# Patient Record
Sex: MALE | Race: WHITE | NOT HISPANIC OR LATINO | Employment: FULL TIME | URBAN - METROPOLITAN AREA
[De-identification: names, ages, dates, MRNs, and addresses within clinical notes are randomized per-mention and may not be internally consistent; named-entity substitution may affect disease eponyms.]

---

## 2019-09-03 ENCOUNTER — OFFICE VISIT - HEALTHEAST (OUTPATIENT)
Dept: FAMILY MEDICINE | Facility: CLINIC | Age: 44
End: 2019-09-03

## 2019-09-03 DIAGNOSIS — E66.09 CLASS 1 OBESITY DUE TO EXCESS CALORIES WITHOUT SERIOUS COMORBIDITY WITH BODY MASS INDEX (BMI) OF 33.0 TO 33.9 IN ADULT: ICD-10-CM

## 2019-09-03 DIAGNOSIS — R03.0 ELEVATED BLOOD PRESSURE READING WITHOUT DIAGNOSIS OF HYPERTENSION: ICD-10-CM

## 2019-09-03 DIAGNOSIS — R06.83 SNORING: ICD-10-CM

## 2019-09-03 DIAGNOSIS — F10.10 ALCOHOL ABUSE: ICD-10-CM

## 2019-09-03 DIAGNOSIS — Z00.00 ROUTINE GENERAL MEDICAL EXAMINATION AT A HEALTH CARE FACILITY: ICD-10-CM

## 2019-09-03 DIAGNOSIS — Z80.42 FAMILY HISTORY OF PROSTATE CANCER: ICD-10-CM

## 2019-09-03 DIAGNOSIS — E66.811 CLASS 1 OBESITY DUE TO EXCESS CALORIES WITHOUT SERIOUS COMORBIDITY WITH BODY MASS INDEX (BMI) OF 33.0 TO 33.9 IN ADULT: ICD-10-CM

## 2019-09-03 DIAGNOSIS — L71.9 ACNE ROSACEA: ICD-10-CM

## 2019-09-03 LAB
ALBUMIN SERPL-MCNC: 4.4 G/DL (ref 3.5–5)
ALP SERPL-CCNC: 67 U/L (ref 45–120)
ALT SERPL W P-5'-P-CCNC: 51 U/L (ref 0–45)
ANION GAP SERPL CALCULATED.3IONS-SCNC: 12 MMOL/L (ref 5–18)
AST SERPL W P-5'-P-CCNC: 34 U/L (ref 0–40)
BILIRUB SERPL-MCNC: 0.5 MG/DL (ref 0–1)
BUN SERPL-MCNC: 11 MG/DL (ref 8–22)
CALCIUM SERPL-MCNC: 10.3 MG/DL (ref 8.5–10.5)
CHLORIDE BLD-SCNC: 105 MMOL/L (ref 98–107)
CHOLEST SERPL-MCNC: 164 MG/DL
CO2 SERPL-SCNC: 25 MMOL/L (ref 22–31)
CREAT SERPL-MCNC: 0.99 MG/DL (ref 0.7–1.3)
ERYTHROCYTE [DISTWIDTH] IN BLOOD BY AUTOMATED COUNT: 11.1 % (ref 11–14.5)
FASTING STATUS PATIENT QL REPORTED: NO
GFR SERPL CREATININE-BSD FRML MDRD: >60 ML/MIN/1.73M2
GGT SERPL-CCNC: 37 U/L (ref 0–50)
GLUCOSE BLD-MCNC: 95 MG/DL (ref 70–125)
HBA1C MFR BLD: 5.6 % (ref 3.5–6)
HCT VFR BLD AUTO: 48.1 % (ref 40–54)
HDLC SERPL-MCNC: 38 MG/DL
HGB BLD-MCNC: 16.7 G/DL (ref 14–18)
MCH RBC QN AUTO: 31.8 PG (ref 27–34)
MCHC RBC AUTO-ENTMCNC: 34.7 G/DL (ref 32–36)
MCV RBC AUTO: 92 FL (ref 80–100)
PLATELET # BLD AUTO: 268 THOU/UL (ref 140–440)
PMV BLD AUTO: 7.2 FL (ref 7–10)
POTASSIUM BLD-SCNC: 4.5 MMOL/L (ref 3.5–5)
PROT SERPL-MCNC: 7.7 G/DL (ref 6–8)
PSA SERPL-MCNC: 1.4 NG/ML (ref 0–2.5)
RBC # BLD AUTO: 5.25 MILL/UL (ref 4.4–6.2)
SODIUM SERPL-SCNC: 142 MMOL/L (ref 136–145)
WBC: 7.5 THOU/UL (ref 4–11)

## 2019-09-03 ASSESSMENT — MIFFLIN-ST. JEOR: SCORE: 1983.75

## 2019-10-22 ENCOUNTER — OFFICE VISIT - HEALTHEAST (OUTPATIENT)
Dept: SLEEP MEDICINE | Facility: CLINIC | Age: 44
End: 2019-10-22

## 2019-10-22 DIAGNOSIS — R03.0 ELEVATED BLOOD PRESSURE READING: ICD-10-CM

## 2019-10-22 DIAGNOSIS — R29.818 SUSPECTED SLEEP APNEA: ICD-10-CM

## 2019-10-22 DIAGNOSIS — G47.8 SLEEP DYSFUNCTION WITH SLEEP STAGE DISTURBANCE: ICD-10-CM

## 2019-10-22 DIAGNOSIS — R06.83 SNORING: ICD-10-CM

## 2019-10-22 ASSESSMENT — MIFFLIN-ST. JEOR: SCORE: 1997.36

## 2019-11-07 ENCOUNTER — RECORDS - HEALTHEAST (OUTPATIENT)
Dept: SLEEP MEDICINE | Facility: CLINIC | Age: 44
End: 2019-11-07

## 2019-11-07 ENCOUNTER — RECORDS - HEALTHEAST (OUTPATIENT)
Dept: ADMINISTRATIVE | Facility: OTHER | Age: 44
End: 2019-11-07

## 2019-11-07 DIAGNOSIS — G47.8 OTHER SLEEP DISORDERS: ICD-10-CM

## 2019-11-07 DIAGNOSIS — R06.83 SNORING: ICD-10-CM

## 2019-11-07 DIAGNOSIS — R29.818 OTHER SYMPTOMS AND SIGNS INVOLVING THE NERVOUS SYSTEM: ICD-10-CM

## 2019-11-07 DIAGNOSIS — R03.0 ELEVATED BLOOD-PRESSURE READING, WITHOUT DIAGNOSIS OF HYPERTENSION: ICD-10-CM

## 2019-11-13 ENCOUNTER — COMMUNICATION - HEALTHEAST (OUTPATIENT)
Dept: ADMINISTRATIVE | Facility: CLINIC | Age: 44
End: 2019-11-13

## 2019-11-13 ENCOUNTER — COMMUNICATION - HEALTHEAST (OUTPATIENT)
Dept: SLEEP MEDICINE | Facility: CLINIC | Age: 44
End: 2019-11-13

## 2019-11-13 DIAGNOSIS — G47.33 OBSTRUCTIVE SLEEP APNEA: ICD-10-CM

## 2019-11-14 ENCOUNTER — AMBULATORY - HEALTHEAST (OUTPATIENT)
Dept: SLEEP MEDICINE | Facility: CLINIC | Age: 44
End: 2019-11-14

## 2019-12-27 ENCOUNTER — OFFICE VISIT - HEALTHEAST (OUTPATIENT)
Dept: FAMILY MEDICINE | Facility: CLINIC | Age: 44
End: 2019-12-27

## 2019-12-27 DIAGNOSIS — R03.0 ELEVATED BLOOD PRESSURE READING WITHOUT DIAGNOSIS OF HYPERTENSION: ICD-10-CM

## 2019-12-27 DIAGNOSIS — F10.10 ALCOHOL ABUSE: ICD-10-CM

## 2019-12-27 DIAGNOSIS — Z23 NEED FOR VACCINATION: ICD-10-CM

## 2019-12-27 DIAGNOSIS — G47.33 OSA ON CPAP: ICD-10-CM

## 2019-12-27 DIAGNOSIS — Z71.84 ENCOUNTER FOR COUNSELING FOR TRAVEL: ICD-10-CM

## 2020-02-03 ENCOUNTER — OFFICE VISIT - HEALTHEAST (OUTPATIENT)
Dept: SLEEP MEDICINE | Facility: CLINIC | Age: 45
End: 2020-02-03

## 2020-02-03 DIAGNOSIS — G47.33 OBSTRUCTIVE SLEEP APNEA: ICD-10-CM

## 2020-02-03 DIAGNOSIS — R03.0 ELEVATED BLOOD PRESSURE READING: ICD-10-CM

## 2020-02-03 DIAGNOSIS — G47.8 SLEEP DYSFUNCTION WITH SLEEP STAGE DISTURBANCE: ICD-10-CM

## 2020-02-03 ASSESSMENT — MIFFLIN-ST. JEOR: SCORE: 2006.43

## 2020-02-25 ENCOUNTER — OFFICE VISIT - HEALTHEAST (OUTPATIENT)
Dept: FAMILY MEDICINE | Facility: CLINIC | Age: 45
End: 2020-02-25

## 2020-02-25 DIAGNOSIS — I10 BENIGN ESSENTIAL HYPERTENSION: ICD-10-CM

## 2020-02-27 LAB
ATRIAL RATE - MUSE: 70 BPM
DIASTOLIC BLOOD PRESSURE - MUSE: NORMAL
INTERPRETATION ECG - MUSE: NORMAL
P AXIS - MUSE: 42 DEGREES
PR INTERVAL - MUSE: 184 MS
QRS DURATION - MUSE: 96 MS
QT - MUSE: 380 MS
QTC - MUSE: 410 MS
R AXIS - MUSE: 13 DEGREES
SYSTOLIC BLOOD PRESSURE - MUSE: NORMAL
T AXIS - MUSE: 15 DEGREES
VENTRICULAR RATE- MUSE: 70 BPM

## 2020-03-06 ENCOUNTER — COMMUNICATION - HEALTHEAST (OUTPATIENT)
Dept: SLEEP MEDICINE | Facility: CLINIC | Age: 45
End: 2020-03-06

## 2020-03-10 ENCOUNTER — OFFICE VISIT - HEALTHEAST (OUTPATIENT)
Dept: FAMILY MEDICINE | Facility: CLINIC | Age: 45
End: 2020-03-10

## 2020-03-10 DIAGNOSIS — G47.33 OSA ON CPAP: ICD-10-CM

## 2020-03-10 DIAGNOSIS — I10 BENIGN ESSENTIAL HYPERTENSION: ICD-10-CM

## 2020-03-10 DIAGNOSIS — I10 ESSENTIAL HYPERTENSION, BENIGN: ICD-10-CM

## 2020-03-10 DIAGNOSIS — F10.10 ALCOHOL ABUSE: ICD-10-CM

## 2020-06-30 ENCOUNTER — OFFICE VISIT - HEALTHEAST (OUTPATIENT)
Dept: FAMILY MEDICINE | Facility: CLINIC | Age: 45
End: 2020-06-30

## 2020-06-30 DIAGNOSIS — S92.425A CLOSED NONDISPLACED FRACTURE OF DISTAL PHALANX OF LEFT GREAT TOE, INITIAL ENCOUNTER: ICD-10-CM

## 2020-06-30 DIAGNOSIS — S99.922A TOE INJURY, LEFT, INITIAL ENCOUNTER: ICD-10-CM

## 2020-09-15 ENCOUNTER — COMMUNICATION - HEALTHEAST (OUTPATIENT)
Dept: FAMILY MEDICINE | Facility: CLINIC | Age: 45
End: 2020-09-15

## 2020-09-15 DIAGNOSIS — I10 BENIGN ESSENTIAL HYPERTENSION: ICD-10-CM

## 2020-09-24 ENCOUNTER — OFFICE VISIT - HEALTHEAST (OUTPATIENT)
Dept: FAMILY MEDICINE | Facility: CLINIC | Age: 45
End: 2020-09-24

## 2020-09-24 DIAGNOSIS — R74.01 ELEVATED ALT MEASUREMENT: ICD-10-CM

## 2020-09-24 DIAGNOSIS — E78.5 DYSLIPIDEMIA: ICD-10-CM

## 2020-09-24 DIAGNOSIS — G47.33 OSA ON CPAP: ICD-10-CM

## 2020-09-24 DIAGNOSIS — E66.811 CLASS 1 OBESITY DUE TO EXCESS CALORIES WITH SERIOUS COMORBIDITY AND BODY MASS INDEX (BMI) OF 34.0 TO 34.9 IN ADULT: ICD-10-CM

## 2020-09-24 DIAGNOSIS — I10 ESSENTIAL HYPERTENSION, BENIGN: ICD-10-CM

## 2020-09-24 DIAGNOSIS — E66.09 CLASS 1 OBESITY DUE TO EXCESS CALORIES WITH SERIOUS COMORBIDITY AND BODY MASS INDEX (BMI) OF 34.0 TO 34.9 IN ADULT: ICD-10-CM

## 2020-09-24 DIAGNOSIS — Z11.4 ENCOUNTER FOR SCREENING FOR HIV: ICD-10-CM

## 2020-09-24 DIAGNOSIS — Z00.00 ROUTINE GENERAL MEDICAL EXAMINATION AT A HEALTH CARE FACILITY: ICD-10-CM

## 2020-09-24 LAB
ALBUMIN SERPL-MCNC: 4.4 G/DL (ref 3.5–5)
ALP SERPL-CCNC: 54 U/L (ref 45–120)
ALT SERPL W P-5'-P-CCNC: 75 U/L (ref 0–45)
ANION GAP SERPL CALCULATED.3IONS-SCNC: 8 MMOL/L (ref 5–18)
AST SERPL W P-5'-P-CCNC: 40 U/L (ref 0–40)
BILIRUB SERPL-MCNC: 0.5 MG/DL (ref 0–1)
BUN SERPL-MCNC: 15 MG/DL (ref 8–22)
CALCIUM SERPL-MCNC: 10.4 MG/DL (ref 8.5–10.5)
CHLORIDE BLD-SCNC: 106 MMOL/L (ref 98–107)
CHOLEST SERPL-MCNC: 153 MG/DL
CO2 SERPL-SCNC: 28 MMOL/L (ref 22–31)
CREAT SERPL-MCNC: 0.88 MG/DL (ref 0.7–1.3)
FASTING STATUS PATIENT QL REPORTED: YES
GFR SERPL CREATININE-BSD FRML MDRD: >60 ML/MIN/1.73M2
GLUCOSE BLD-MCNC: 89 MG/DL (ref 70–125)
HDLC SERPL-MCNC: 41 MG/DL
HIV 1+2 AB+HIV1 P24 AG SERPL QL IA: NEGATIVE
LDLC SERPL CALC-MCNC: 81 MG/DL
POTASSIUM BLD-SCNC: 4.4 MMOL/L (ref 3.5–5)
PROT SERPL-MCNC: 7.6 G/DL (ref 6–8)
SODIUM SERPL-SCNC: 142 MMOL/L (ref 136–145)
TRIGL SERPL-MCNC: 153 MG/DL

## 2020-09-24 ASSESSMENT — MIFFLIN-ST. JEOR: SCORE: 2006.43

## 2020-12-18 DIAGNOSIS — G47.33 OSA (OBSTRUCTIVE SLEEP APNEA): Primary | ICD-10-CM

## 2020-12-21 ENCOUNTER — TELEPHONE (OUTPATIENT)
Dept: SLEEP MEDICINE | Facility: CLINIC | Age: 45
End: 2020-12-21

## 2020-12-21 DIAGNOSIS — G47.33 OBSTRUCTIVE SLEEP APNEA: Primary | ICD-10-CM

## 2020-12-21 NOTE — TELEPHONE ENCOUNTER
Reason for call:  Other   Patient called regarding (reason for call): prescription    Additional comments:   CPAP Supplies- mask & headband    Phone number to reach patient:  Cell number on file:    Telephone Information:   Mobile [phone]       Best Time:  any    Can we leave a detailed message on this number?  YES    Travel screening: Not Applicable

## 2021-01-06 RX ORDER — LISINOPRIL 20 MG/1
TABLET ORAL
COMMUNITY
Start: 2020-09-16 | End: 2021-09-28 | Stop reason: DRUGHIGH

## 2021-01-06 NOTE — PATIENT INSTRUCTIONS

## 2021-01-06 NOTE — PROGRESS NOTES
Flash Nicholas is a 45 year old male who is being evaluated via a billable video visit.      How would you like to obtain your AVS? Mail a copy  If the video visit is dropped, the invitation should be resent by: Send to e-mail at: [email or web address]  Will anyone else be joining your video visit? No    Video-Visit Details    Type of service:  Video Visit    Originating Location (pt. Location): Home    Distant Location (provider location):  [facility]     Platform used for Video Visit: AntionetteLumiata     Thank you for the opportunity to participate in the care of Flash Nicholas.     He is a 45 year old y/o male patient who comes to the sleep medicine clinic for follow up.  Since the patient's last clinical visit with me, he is doing well on CPAP. He just needs a prescription to get supplies     Assessment and Plan:  In summary Flash Nicholas is a 45 year old year old male who is here for follow up.    1. Obstructive sleep apnea  I congratulated the patient on his excellent CPAP usage. I will keep him on the same pressure range as requested.  - COMPREHENSIVE DME     Compliance Download data for 30 Days:  Pressure setting:APAP 5-15 cwp  95% pressure:9.5 cwp  Residual AHI:0.8 events per hour  Leak:Minimal  Compliance:97%  Mask Tolerance:Good  Skin irritation:None  DME:[facility]    Sleep-Wake Cycle:    The patient likes to initiate sleep at around 8PM with a sleep latency of less than 10 minutes. The patient has 2 nocturnal awakenings. Final wake up time is around 7AM.      Past Medical History:   Diagnosis Date     HTN (hypertension)      KEN (obstructive sleep apnea)        History reviewed. No pertinent surgical history.    Social History     Socioeconomic History     Marital status:      Spouse name: Not on file     Number of children: Not on file     Years of education: Not on file     Highest education level: Not on file   Occupational History     Not on file   Social Needs      Financial resource strain: Not on file     Food insecurity     Worry: Not on file     Inability: Not on file     Transportation needs     Medical: Not on file     Non-medical: Not on file   Tobacco Use     Smoking status: Never Smoker     Smokeless tobacco: Never Used   Substance and Sexual Activity     Alcohol use: Not on file     Drug use: Not on file     Sexual activity: Not on file   Lifestyle     Physical activity     Days per week: Not on file     Minutes per session: Not on file     Stress: Not on file   Relationships     Social connections     Talks on phone: Not on file     Gets together: Not on file     Attends Pentecostalism service: Not on file     Active member of club or organization: Not on file     Attends meetings of clubs or organizations: Not on file     Relationship status: Not on file     Intimate partner violence     Fear of current or ex partner: Not on file     Emotionally abused: Not on file     Physically abused: Not on file     Forced sexual activity: Not on file   Other Topics Concern     Not on file   Social History Narrative     Not on file       Current Outpatient Medications   Medication Sig Dispense Refill     lisinopril (ZESTRIL) 20 MG tablet TAKE 1 TABLET BY MOUTH EVERY DAY         No Known Allergies    Physical Exam:  GEN: NAD,  Psych: normal mood, normal affect    Labs/Studies:    I reviewed the efficacy and compliance report from his device. Data summarized on the HPI and the PAP compliance flow sheet.        Patient verbalized understanding of these issues, agrees with the plan and all questions were answered today. Patient was given an opportuntity to voice any other symptoms or concerns not listed above. Patient did not have any other symptoms or concerns.      Jordin Ng DO  Board Certified in Internal Medicine and Sleep Medicine    (Note created with Dragon voice recognition and unintended spelling errors and word substitutions may occur)     I spent a total of 15  minutes of face-to-face encounter and in preparation for this clinic visit.    Audio and visual devices were used for this virtual clinic visit with permission from patient.

## 2021-01-07 ENCOUNTER — VIRTUAL VISIT (OUTPATIENT)
Dept: SLEEP MEDICINE | Facility: CLINIC | Age: 46
End: 2021-01-07
Payer: COMMERCIAL

## 2021-01-07 DIAGNOSIS — G47.33 OBSTRUCTIVE SLEEP APNEA: Primary | ICD-10-CM

## 2021-01-07 PROCEDURE — 99212 OFFICE O/P EST SF 10 MIN: CPT | Mod: 95 | Performed by: INTERNAL MEDICINE

## 2021-03-24 ENCOUNTER — COMMUNICATION - HEALTHEAST (OUTPATIENT)
Dept: FAMILY MEDICINE | Facility: CLINIC | Age: 46
End: 2021-03-24

## 2021-03-24 DIAGNOSIS — I10 BENIGN ESSENTIAL HYPERTENSION: ICD-10-CM

## 2021-04-06 ENCOUNTER — OFFICE VISIT - HEALTHEAST (OUTPATIENT)
Dept: FAMILY MEDICINE | Facility: CLINIC | Age: 46
End: 2021-04-06

## 2021-04-06 DIAGNOSIS — G47.33 OSA ON CPAP: ICD-10-CM

## 2021-04-06 DIAGNOSIS — I10 ESSENTIAL HYPERTENSION, BENIGN: ICD-10-CM

## 2021-04-06 DIAGNOSIS — E66.01 MORBID OBESITY (H): ICD-10-CM

## 2021-04-06 DIAGNOSIS — R74.01 ELEVATED ALT MEASUREMENT: ICD-10-CM

## 2021-04-06 LAB
ALBUMIN SERPL-MCNC: 4.3 G/DL (ref 3.5–5)
ALP SERPL-CCNC: 56 U/L (ref 45–120)
ALT SERPL W P-5'-P-CCNC: 79 U/L (ref 0–45)
ANION GAP SERPL CALCULATED.3IONS-SCNC: 11 MMOL/L (ref 5–18)
AST SERPL W P-5'-P-CCNC: 44 U/L (ref 0–40)
BILIRUB SERPL-MCNC: 0.6 MG/DL (ref 0–1)
BUN SERPL-MCNC: 13 MG/DL (ref 8–22)
CALCIUM SERPL-MCNC: 10 MG/DL (ref 8.5–10.5)
CHLORIDE BLD-SCNC: 103 MMOL/L (ref 98–107)
CO2 SERPL-SCNC: 26 MMOL/L (ref 22–31)
CREAT SERPL-MCNC: 0.91 MG/DL (ref 0.7–1.3)
GFR SERPL CREATININE-BSD FRML MDRD: >60 ML/MIN/1.73M2
GLUCOSE BLD-MCNC: 97 MG/DL (ref 70–125)
POTASSIUM BLD-SCNC: 4.6 MMOL/L (ref 3.5–5)
PROT SERPL-MCNC: 7.5 G/DL (ref 6–8)
SODIUM SERPL-SCNC: 140 MMOL/L (ref 136–145)

## 2021-04-07 LAB
HAV IGM SERPL QL IA: NEGATIVE
HBV CORE IGM SERPL QL IA: NEGATIVE
HBV SURFACE AG SERPL QL IA: NEGATIVE
HCV AB SERPL QL IA: NEGATIVE

## 2021-05-27 ENCOUNTER — RECORDS - HEALTHEAST (OUTPATIENT)
Dept: ADMINISTRATIVE | Facility: CLINIC | Age: 46
End: 2021-05-27

## 2021-05-31 NOTE — PROGRESS NOTES
Assessment/Plan:     1. Routine general medical examination at a health care facility  Routine healthcare maintenance reviewed.  Preventative cares discussed.  Tetanus booster was provided today.  Annual physical exams to continue.  - Tdap vaccine greater than or equal to 8yo IM    2. Class 1 obesity due to excess calories without serious comorbidity with body mass index (BMI) of 33.0 to 33.9 in adult  Dietary and exercise modifications reviewed for weight goal less than 220 pounds initially, less than 200 pounds ideally.  Nonfasting labs including total cholesterol and HDL cholesterol pending.  - HDL Cholesterol  - Cholesterol, Total  - A1c    3. Elevated blood pressure reading without diagnosis of hypertension  Elevated blood pressure 138/96 on recheck.  Dietary and exercise modification for weight goal as noted above.  Sleep study to rule out obstructive sleep apnea.  Role of alcohol in regard to blood pressure elevation also reviewed.  Anticipate blood pressure medication if persistent elevation noted.  - Comprehensive Metabolic Panel    4. Alcohol abuse  Alcohol abuse history having previously consumed 3 cases of beer per week.  Now has cut back to perhaps 8 beer on Sunday.  Does not feel that he has a problem per se however wife is adamant that this is excessive and has described yellowish eyes previously.  Improvement with cutting back on alcohol.  Will check CBC, GGT and conference of metabolic panels today.  Recommend abstaining from alcohol however needs to minimally restrict to no more than 2 ounces of alcohol daily.  - Comprehensive Metabolic Panel  - GGT (Gamma GT)  - HM2(CBC w/o Differential)    5. Family history of prostate cancer  PSA per patient request for family history of prostate cancer maternal grandfather.  Discussed indications for PSA screen.  Patient would like to complete testing at this time despite recommendations against.  - PSA (Prostatic-Specific Antigen), Annual Screen    6.  "Acne rosacea  Acne rosacea, mild.  Benefits of alcohol abstinence etc. reviewed.    7. Snoring  Sleep study to be completed to rule out obstructive sleep apnea.  Patient's wife states apneic episodes as long as 35 seconds previously noted.  Therapeutic lifestyle changes reviewed.  Benefits from abstinence from alcohol.  - Ambulatory referral to Sleep Medicine       I have had an Advance Directives discussion with the patient.  The following high BMI interventions were performed this visit: encouragement to exercise, weight monitoring, weight loss from baseline weight and lifestyle education regarding diet.  Ensure ongoing efforts to achieve weight goal < 220 pounds initially, < 200 pounds ideally.           Subjective:      Flash Nicholas is a 44 y.o. male who presents for an annual exam.  Has not been seen for physical and possibly 20 years.  Needs biometric paperwork completed for employer.  Nonfasting.  Does not want to wait for fasting labs.  Would like PSA screen done.  Wife also would like AST or ALT levels done with history of yellowish eyes when consuming significant alcohol historically up to 3 cases of beer per week now states cut back to about a beer on Sundays when at the [employer].  Wife has noticed apneic episodes dating up to 35 seconds in length.  Unaware of high blood pressure.  Often gets only 5 or 6 hours of sleep the most however can \"jump out of bed\".  No abdominal complaints.  No diarrhea.  Facial redness noted.  Worse during the summer with alcohol consumption especially.  Maternal grandfather with prostate cancer.  Past medical social and family history reviewed and updated as noted below.  Father  age 45 secondary to described heart attack.  Comprehensive review of systems as above otherwise all negative.     \"Dilia\" x   3 children: Fili (boy), Marcella (girl), Alban (boy)   No smoke x    EtOH:  prior history of 3 cases of beer per week, now perhaps 8 beer on " "  Surgeries: vasectomy 06   Hospitalizations: none   Work: [employer] (james at [employer])   Mom - \"spell\" at work resulting in \"passed out\"   Dad -  45 heart attack   1 younger sister -   Maternal grandfather with h/o prostate CA and esophageal CA    Healthy Habits:   Regular Exercise: No  Healthy Diet: No  Dental Visits Regularly: Yes  Seat Belt: Yes   Sexually active: Yes  Colonoscopy: N/A  Lipid Profile: Yes  Glucose Screen: Yes    Immunization History   Administered Date(s) Administered     Td, Adult, Absorbed 2005     Tdap 2019     Immunization status: needs Tdap booster.  Vision Screening:both eyes  Hearing: PASS     No current outpatient medications on file.     No current facility-administered medications for this visit.      History reviewed. No pertinent past medical history.  History reviewed. No pertinent surgical history.  Patient has no known allergies.  No family history on file.  Social History     Socioeconomic History     Marital status:      Spouse name: Not on file     Number of children: Not on file     Years of education: Not on file     Highest education level: Not on file   Occupational History     Not on file   Social Needs     Financial resource strain: Not on file     Food insecurity:     Worry: Not on file     Inability: Not on file     Transportation needs:     Medical: Not on file     Non-medical: Not on file   Tobacco Use     Smoking status: Former Smoker     Smokeless tobacco: Never Used   Substance and Sexual Activity     Alcohol use: Not on file     Drug use: Not on file     Sexual activity: Not on file   Lifestyle     Physical activity:     Days per week: Not on file     Minutes per session: Not on file     Stress: Not on file   Relationships     Social connections:     Talks on phone: Not on file     Gets together: Not on file     Attends Mosque service: Not on file     Active member of club or organization: Not on file     Attends " "meetings of clubs or organizations: Not on file     Relationship status: Not on file     Intimate partner violence:     Fear of current or ex partner: Not on file     Emotionally abused: Not on file     Physically abused: Not on file     Forced sexual activity: Not on file   Other Topics Concern     Not on file   Social History Narrative     Not on file       Review of Systems  Comprehensive ROS: as above, otherwise all negative.           Objective:     BP (!) 138/96   Pulse 73   Temp 98  F (36.7  C) (Oral)   Resp 20   Ht 5' 10.5\" (1.791 m)   Wt (!) 238 lb (108 kg)   SpO2 95%   BMI 33.67 kg/m    Body mass index is 33.67 kg/m .    Physical    General Appearance:    Alert, cooperative, no distress, appears stated age.  Obesity.   Head:    Normocephalic, without obvious abnormality, atraumatic   Eyes:    PERRL, conjunctiva/corneas clear, EOM's intact, fundi     benign, both eyes        Ears:    Normal TM's and external ear canals, both ears   Nose:   Nares normal, septum midline, mucosa normal, no drainage    or sinus tenderness   Throat:   Lips, mucosa, and tongue normal; teeth and gums normal   Neck:   Supple, symmetrical, trachea midline, no adenopathy;        thyroid:  No enlargement/tenderness/nodules; no carotid    bruit or JVD   Back:     Symmetric, no curvature, ROM normal, no CVA tenderness   Lungs:     Clear to auscultation bilaterally, respirations unlabored   Chest wall:    No tenderness or deformity   Heart:    Regular rate and rhythm, S1 and S2 normal, no murmur, rub   or gallop   Abdomen:     Soft, non-tender, bowel sounds active all four quadrants,     no masses, no organomegaly.     Genitalia:    Normal male without lesion, discharge or tenderness.  No inguinal hernia noted.     Rectal:    deferred   Extremities:   Extremities normal, atraumatic, no cyanosis or edema   Pulses:   2+ and symmetric all extremities   Skin:   Skin color, texture, turgor normal, no rashes or lesions.evidence for mild " acne rosacea involving cheeks primarily.   Lymph nodes:   Cervical, supraclavicular, and axillary nodes normal   Neurologic:   CNII-XII intact. Normal strength, sensation and reflexes       throughout                This note has been dictated using voice recognition software and as a result may contain minor grammatical errors and unintended word substitutions.

## 2021-06-02 NOTE — PROGRESS NOTES
Dear Dr. Devi, [facility]  [address]  [address]  [place] [address]    Thank you for the opportunity to participate in the care of . Flash Nicholas.    He is a 44 y.o. male who comes to the clinic with a chief complaint of witnessed apnea that is been going on for more than a year.  The patient denies any episodes of excessive daytime sleepiness or fatigue but his Manheim Sleepiness Scale was grossly elevated at 12 during this visit.  His wife has also noticed that he has significant pauses in his breathing during sleep followed by loud snoring that can be heard all over the house.  The patient's review of system is otherwise negative.     Ideal Sleep-Wake Cycle(devoid of societal pressure):    Patient would try to initiate sleep at around 8-8:30 PM with a sleep latency of less than 5 minutes. The patient would have 2 awakening. Final wake up time is around 3 AM.    Past Medical History  No past medical history on file.     Past Surgical History  No past surgical history on file.     Meds  No current outpatient medications on file.     No current facility-administered medications for this visit.         Allergies  Patient has no known allergies.     Social History  Social History     Socioeconomic History     Marital status:      Spouse name: Not on file     Number of children: Not on file     Years of education: Not on file     Highest education level: Not on file   Occupational History     Not on file   Social Needs     Financial resource strain: Not on file     Food insecurity:     Worry: Not on file     Inability: Not on file     Transportation needs:     Medical: Not on file     Non-medical: Not on file   Tobacco Use     Smoking status: Former Smoker     Smokeless tobacco: Never Used   Substance and Sexual Activity     Alcohol use: Not on file     Drug use: Not on file     Sexual activity: Not on file   Lifestyle     Physical activity:     Days per week: Not on file     Minutes per session:  Not on file     Stress: Not on file   Relationships     Social connections:     Talks on phone: Not on file     Gets together: Not on file     Attends Baptism service: Not on file     Active member of club or organization: Not on file     Attends meetings of clubs or organizations: Not on file     Relationship status: Not on file     Intimate partner violence:     Fear of current or ex partner: Not on file     Emotionally abused: Not on file     Physically abused: Not on file     Forced sexual activity: Not on file   Other Topics Concern     Not on file   Social History Narrative     Not on file        Family History  No family history on file.     Review of Systems:  Constitutional: Negative except as noted in HPI.   Eyes: Negative except as noted in HPI.   ENT: Negative except as noted in HPI.   Cardiovascular: Negative except as noted in HPI.   Respiratory: Negative except as noted in HPI.   Gastrointestinal: Negative except as noted in HPI.   Genitourinary: Negative except as noted in HPI.   Musculoskeletal: Negative except as noted in HPI.   Integumentary: Negative except as noted in HPI.   Neurological: Negative except as noted in HPI.   Psychiatric: Negative except as noted in HPI.   Endocrine: Negative except as noted in HPI.   Hematologic/Lymphatic: Negative except as noted in HPI.      STOP BANG 10/22/2019   Do you snore loudly (louder than talking or loud enough to be heard through closed doors)? 1   Do you often feel tired, fatigued, or sleepy during daytime? 0   Has anyone observed you stop breathing in your sleep? 1   Do you have or are you being treated for high blood pressure? 1   BMI more than 35 kg/m2 0   Age over 50 years old? 0   Neck circumference greater than 16 inches? 1   Gender male? 1   Total Score 5     Epworths Sleepiness Scale 10/22/2019   Sitting and reading 3   Watching TV 2   Sitting, inactive in a public place (e.g. a theatre or a meeting) 1   As a passenger in a car for an hour  "without a break 2   Lying down to rest in the afternoon when circumstances permit 3   Sitting and talking to someone 1   Sitting quietly after a lunch without alcohol 1   In a car, while stopped for a few minutes in traffic 0   Total score 13     Rooming 10/22/2019   Usual bedtime 8-8:30   Sleep Latency within 5 minutes   Awakenings 2 times   Wake Up Time 2:30-3   Weekends same   Energy Drinks no   Coffee 1 20oz qd   Cola occasionally    Difficulty falling asleep No   Difficulty staying asleep No   Excessive daytime tiredness No   Excessive daytime sleepiness No   Dozing off while driving No   Shift Worker No   Sleep Walking? No   Sleep Talking? No   Kicking or punching? No   Restless legs symptoms Yes       Physical Exam:  BP (!) 159/117   Pulse 71   Ht 5' 10.5\" (1.791 m)   Wt (!) 241 lb (109.3 kg)   SpO2 96%   BMI 34.09 kg/m    BMI:Body mass index is 34.09 kg/m .   GEN: NAD, obese  Head: Normocephalic.  EYES: PERRLA, EOMI  ENT: Oropharynx is clear, Moore class 4+ airway.   Nasal mucosa is moist without erythema  Neck : Thyroid is within normal limits. Neck Circ 17.75\"  CV: Regular rate and rhythm, S1 & S2 positive.  LUNGS: Bilateral breathsounds heard.   ABDOMEN: Positive bowel sounds in all quadrants, soft, no rebound or guarding  MUSCULOSKELETAL: Bilateral trace leg swelling  SKIN: warm, dry, no rashes  Neurological: Alert, oriented to time, place, and person.  Psych: normal mood, normal affect     Labs/Studies:     Lab Results   Component Value Date    WBC 7.5 09/03/2019    HGB 16.7 09/03/2019    HCT 48.1 09/03/2019    MCV 92 09/03/2019     09/03/2019         Chemistry        Component Value Date/Time     09/03/2019 1603    K 4.5 09/03/2019 1603     09/03/2019 1603    CO2 25 09/03/2019 1603    BUN 11 09/03/2019 1603    CREATININE 0.99 09/03/2019 1603    GLU 95 09/03/2019 1603        Component Value Date/Time    CALCIUM 10.3 09/03/2019 1603    ALKPHOS 67 09/03/2019 1603    AST 34 " 09/03/2019 1603    ALT 51 (H) 09/03/2019 1603    BILITOT 0.5 09/03/2019 1603            No results found for: FERRITIN  No results found for: TSH      Assessment and Plan:  In summary Flash Nicholas is a 44 y.o. year old male here for sleep disturbance.  1.  Suspected sleep apnea   Mr. Flash Nicholas has high risk for obstructive sleep apnea based on the history of witnessed apnea, snoring and a crowded airway. I educated the patient on the underlying pathophysiology of obstructive sleep apnea. We reviewed the risks associated with sleep apnea, including increased cardiovascular risk and overall death. We talked about treatments briefly. I recommend getting a Home sleep study. The patient should return to the clinic to discuss results and treatment option in a patient-centered approach.  2.  Snoring  3.  Other sleep disturbance  4.  Elevated blood pressure reading  I will have the patient's blood pressure readings repeated during this clinic visit. I strongly advised the patient to follow up with PCP in one week.    Patient verbalized understanding of these issues, agrees with the plan and all questions were answered today. Patient was given an opportuntity to voice any other symptoms or concerns not listed above. Patient did not have any other symptoms or concerns.      Patient told to return in one week after the sleep study is interpreted.      Jordin Ng DO  Board Certified in Internal Medicine and Sleep Medicine  [facility].    (Note created with Dragon voice recognition and unintended spelling errors and word substitutions may occur)

## 2021-06-02 NOTE — PATIENT INSTRUCTIONS - HE
Follow-up with PCP regarding elevated blood pressure:   BP Readings from Last 3 Encounters:   10/22/19 (!) 159/117   09/03/19 (!) 138/96   09/19/16 134/80     What is a Home Sleep Study?    your doctor can give you a portable sleep monitor to use at home, so you don t have to spend the night in the sleep lab. But you should use a portable monitor only if:   ?Your doctor thinks you have a condition that makes you stop breathing for short periods while you are asleep, called  sleep apnea.    ?You do not have other serious medical problems, such as heart disease or lung disease.    Please bring the home sleep study device back to the sleep center as soon as you are finished with it so we can score it.     The cost of care estimate line is [phone]. They are able to give the patient an estimate of the charges and also an estimate of their insurance coverage/patient responsibility.   After your sleep study is performed, please call us at [phone] to schedule for a follow up to review the results of the sleep study.    Consider using one tab of low dose melatonin 3 mg or less on the night of the study.    It is completely voluntary.    Do not drive or operate machinery after intake of melatonin.

## 2021-06-03 VITALS
HEIGHT: 71 IN | TEMPERATURE: 98 F | DIASTOLIC BLOOD PRESSURE: 96 MMHG | HEART RATE: 73 BPM | WEIGHT: 238 LBS | RESPIRATION RATE: 20 BRPM | BODY MASS INDEX: 33.32 KG/M2 | SYSTOLIC BLOOD PRESSURE: 138 MMHG | OXYGEN SATURATION: 95 %

## 2021-06-03 VITALS
HEIGHT: 71 IN | OXYGEN SATURATION: 96 % | SYSTOLIC BLOOD PRESSURE: 153 MMHG | DIASTOLIC BLOOD PRESSURE: 110 MMHG | BODY MASS INDEX: 33.74 KG/M2 | HEART RATE: 71 BPM | WEIGHT: 241 LBS

## 2021-06-03 NOTE — TELEPHONE ENCOUNTER
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

## 2021-06-03 NOTE — TELEPHONE ENCOUNTER
The overnight polysomnography was reviewed.   The patient had obstructive sleep apnea.    I have called the patient and informed his of the results. I offered the patient the option of getting started on pressure therapy and the patient agreed. The patient would like to think about his DME options. He will call us back with his DME of choice.    The prescription is in the chart.    Thank you.

## 2021-06-03 NOTE — PROGRESS NOTES
Order for Durable Medical Equipment was processed and equipment ordered.     DME provider: [facility]    Date Faxed: 11/14/2019    Ordering Provider: Jordin Ng DO    PAP Order Type: New CPAP order    Fax Number: IN HOUSE DME: [id number]

## 2021-06-04 VITALS
HEART RATE: 70 BPM | OXYGEN SATURATION: 97 % | DIASTOLIC BLOOD PRESSURE: 94 MMHG | WEIGHT: 242 LBS | SYSTOLIC BLOOD PRESSURE: 126 MMHG | BODY MASS INDEX: 34.23 KG/M2

## 2021-06-04 VITALS
BODY MASS INDEX: 33.81 KG/M2 | WEIGHT: 239 LBS | OXYGEN SATURATION: 97 % | DIASTOLIC BLOOD PRESSURE: 92 MMHG | SYSTOLIC BLOOD PRESSURE: 134 MMHG | HEART RATE: 68 BPM

## 2021-06-04 VITALS
DIASTOLIC BLOOD PRESSURE: 100 MMHG | BODY MASS INDEX: 34.2 KG/M2 | SYSTOLIC BLOOD PRESSURE: 138 MMHG | WEIGHT: 241.8 LBS | HEART RATE: 74 BPM | OXYGEN SATURATION: 96 %

## 2021-06-04 VITALS
BODY MASS INDEX: 34.02 KG/M2 | WEIGHT: 243 LBS | OXYGEN SATURATION: 95 % | DIASTOLIC BLOOD PRESSURE: 98 MMHG | SYSTOLIC BLOOD PRESSURE: 130 MMHG | HEART RATE: 74 BPM | HEIGHT: 71 IN

## 2021-06-04 VITALS
HEART RATE: 70 BPM | DIASTOLIC BLOOD PRESSURE: 80 MMHG | OXYGEN SATURATION: 98 % | WEIGHT: 243.9 LBS | SYSTOLIC BLOOD PRESSURE: 126 MMHG | BODY MASS INDEX: 34.5 KG/M2

## 2021-06-04 NOTE — PROGRESS NOTES
Assessment/Plan:    1. Elevated blood pressure reading without diagnosis of hypertension  Elevated blood pressure.  Blood pressure 134/92 on recheck.  Now on CPAP for KEN management, severe.  Has cut back to about 2 drinks per week instead of 3 cases of beer per week previously.  Will monitor outside clinic and reassess at physical exam September 2020, sooner if continued elevation noted.  Weight goal remains less than 230 pounds initially, less than 220 pounds ideally.    2. EKN on CPAP  Continue CPAP 5-20 cm water pressure for KEN management.    3. Encounter for counseling for travel  Travel counseling reviewed.  Oral typhoid vaccination to be completed.  Hepatitis A and seasonal flu shot also provided.  Anticipate hepatitis A booster in 6 months.  - typhoid (VIVOTIF) SR capsule; Take 1 capsule by mouth every other day for 4 doses.  Dispense: 4 capsule; Refill: 0    4. Alcohol abuse  Has cut back significantly from alcohol consumption.  Currently 2 beer per week approximately with prior 3 cases of beer per week historically.  Prior ALT elevation 51 otherwise LFTs normal including GGT 37.    5. Need for vaccination  Will provide hepatitis A booster in 6 months.  - Hepatitis A adult 2 dose IM (19 yr & older)  - Influenza,Seasonal,Quad,INJ =/>6months  - typhoid (VIVOTIF) SR capsule; Take 1 capsule by mouth every other day for 4 doses.  Dispense: 4 capsule; Refill: 0      The following high BMI interventions were performed this visit: encouragement to exercise, weight monitoring, weight loss from baseline weight and lifestyle education regarding diet         Subjective:    Flash Nicholas is seen today for follow-up evaluation.  Underlying history of elevated blood pressure with significant alcohol consumption historically up to 3 cases of beer per week.  Has cut back significantly and now exercising on a more regular basis.  Has perhaps 2 drinks per week only.  Was diagnosed with severe obstructive sleep apnea now  "on CPAP 5-20 cm water pressure.  Has follow-up with Dr. Heath with sleep clinic February 3, 2020.  Traveling to [facility]  through 2020.  Needs travel immunizations.  Denies asthma.  Will be snorkeling and not scuba diving.  Denies recent illness.  Needs flu shot.  Father  age 45 from a heart attack described.  Patient without chest pain, orthopnea, PND etc.  Comprehensive review of systems as above otherwise all negative     \"Dilia\" x   3 children: Fili (boy), Marcella (girl), Alban (boy)   No smoke x    EtOH: prior history of 3 cases of beer per week, now perhaps 8 beer on   Surgeries: vasectomy 06   Hospitalizations: none   Work: [employer] (james at [employer])   Mom - \"spell\" at work resulting in \"passed out\"   Dad -  45 heart attack   1 younger sister -   Maternal grandfather with h/o prostate CA and esophageal CA    History reviewed. No pertinent surgical history.     Family History   Problem Relation Age of Onset     Snoring Father         History reviewed. No pertinent past medical history.     Social History     Tobacco Use     Smoking status: Former Smoker     Packs/day: 0.00     Smokeless tobacco: Never Used   Substance Use Topics     Alcohol use: Not on file     Drug use: Not on file        Current Outpatient Medications   Medication Sig Dispense Refill     typhoid (VIVOTIF) SR capsule Take 1 capsule by mouth every other day for 4 doses. 4 capsule 0     No current facility-administered medications for this visit.           Objective:    Vitals:    19 0842 19 0922   BP: 130/90 (!) 134/92   Pulse: 68    SpO2: 97%    Weight: (!) 239 lb (108.4 kg)       Body mass index is 33.81 kg/m .    Alert.  No apparent distress.  Chest clear.  Cardiac exam regular.  Blood pressure 134/92 on recheck.  Extremities warm and dry.  BMI 33.81.      This note has been dictated using voice recognition software and as a result may contain minor " grammatical errors and unintended word substitutions.

## 2021-06-05 VITALS
HEART RATE: 60 BPM | BODY MASS INDEX: 34.02 KG/M2 | TEMPERATURE: 97.3 F | DIASTOLIC BLOOD PRESSURE: 80 MMHG | HEIGHT: 71 IN | SYSTOLIC BLOOD PRESSURE: 118 MMHG | OXYGEN SATURATION: 96 % | WEIGHT: 243 LBS

## 2021-06-05 VITALS
HEART RATE: 79 BPM | WEIGHT: 256 LBS | BODY MASS INDEX: 36.21 KG/M2 | DIASTOLIC BLOOD PRESSURE: 80 MMHG | OXYGEN SATURATION: 98 % | SYSTOLIC BLOOD PRESSURE: 130 MMHG | TEMPERATURE: 98.1 F

## 2021-06-05 NOTE — PROGRESS NOTES
Order for Durable Medical Equipment was processed and equipment ordered.     DME provider: [facility]    Date Faxed: 2/3/2020    Ordering Provider: Jordin Ng DO    PAP Order Type: Setting/Pressure change    Fax Number: Sent to [facility]

## 2021-06-05 NOTE — PROGRESS NOTES
Dear Dr. Devi, Sathya KNUTSON MD  [address]  [address]  [place] [address],    Thank you for the opportunity to participate in the care of Flash Nicholas.     He is a 44 y.o.  male patient who comes to the sleep medicine clinic for review of his sleep study and compliance download data. The study was completed on 11/7/2019 which showed that the patient had severe obstructive sleep apnea with an apnea hypopnea index of 48.3 events per hour with the lowest O2 sat of 72%.  The patient was informed of the results and offered the option to initiate CPAP therapy which he agreed to.  Since starting CPAP therapy, his wife is sleeping a lot better.  He also states that he is feeling a bit more refreshed upon awakening compared to before.    Assessment and Plan:  .1. Obstructive sleep apnea  I reviewed the patient's results with him in detail.  I congratulated him on his excellent CPAP usage.  I will narrow his CPAP pressure range to APAP 5-10 CWP.    2. Sleep dysfunction with sleep stage disturbance    3. Elevated blood pressure reading  I will have the patient's blood pressure readings repeated during this clinic visit. I strongly advised the patient to follow up with PCP in one week.    Compliance Download data for 30 days:  Pressure setting: APAP 5-20 CWP  Residual AHI: 0.5 events per hour  Leak: Minimal  Compliance: 93%  Mask Tolerance: Good  Skin irritation: None    No current outpatient medications on file.     No current facility-administered medications for this visit.        No Known Allergies    Epworths Sleepiness Scale 10/22/2019 2/3/2020   Sitting and reading 3 2   Watching TV 2 0   Sitting, inactive in a public place (e.g. a theatre or a meeting) 1 1   As a passenger in a car for an hour without a break 2 0   Lying down to rest in the afternoon when circumstances permit 3 2   Sitting and talking to someone 1 0   Sitting quietly after a lunch without alcohol 1 0   In a car, while stopped for a few minutes  "in traffic 0 0   Total score 13 5       Physical Exam:  BP (!) 149/106 (Patient Site: Right Arm, Patient Position: Sitting, Cuff Size: Adult Large)   Pulse 74   Ht 5' 10.5\" (1.791 m)   Wt (!) 243 lb (110.2 kg)   SpO2 95%   BMI 34.37 kg/m    BMI:Body mass index is 34.37 kg/m .   GEN: NAD, obese  Psych: normal mood, normal affect     Labs/Studies:  - We reviewed the results of the overnight PSG as described on the HPI.      Patient verbalized understanding of these issues, agrees with the plan and all questions were answered today. Patient was given an opportuntity to voice any other symptoms or concerns not listed above. Patient did not have any other symptoms or concerns.        Jordin Ng DO  Board Certified in Internal Medicine and Sleep Medicine      (Note created with Dragon voice recognition and unintended spelling errors and word substitutions may occur)     "

## 2021-06-06 NOTE — PROGRESS NOTES
"Assessment/Plan:    1. Benign essential hypertension  New diagnosis of hypertension today.  Reviewed previous readings which have been elevated.  Given new diagnosis and family history, EKG was performed and interpreted today.  This is without any significant findings.  Will start lisinopril 10 mg by mouth once daily.  Patient has a follow-up appointment scheduled in roughly 2 weeks to reassess blood pressure.  Will make any medication dose adjustments at that time as indicated.  We discussed potential side effects from medication to monitor for and red flag symptoms that would trigger immediate evaluation.  Patient feels content with this plan.   - lisinopriL (PRINIVIL,ZESTRIL) 10 MG tablet; Take 1 tablet (10 mg total) by mouth daily.  Dispense: 30 tablet; Refill: 11  - Electrocardiogram Perform and Read      Subjective:    Flash Nicholas is a 45 year old female seen today for blood pressure follow-up.  Patient was seen in clinic roughly 2 months ago with elevated blood pressure.  No personal history of hypertension.  He does report strong family history of cardiovascular disease.  Patient's father  of massive MI when he was 44 years old which has patient concerned about his own health now.  Patient was going to work on lifestyle modifications including improving diet and increasing amount of exercise over the next couple of months and reassess at his physical in roughly 6 months.  However, he was scheduled to have a tooth extracted and blood pressure was noted to be elevated with the diastolic over 100.  He was not allowed to proceed with the procedure until he was evaluated.  He has been checking his blood pressure at home on occasion since then and it has always remained elevated between 140 and 150 systolic over 100 diastolic.  He denies any chest pain or shortness of breath, no dyspnea on exertion.  No lower extremity edema.  Occasionally, feels as though his heart is beating \"harder\" otherwise reports " feeling very normal and well.  Review of systems is as stated in HPI, and the remainder of the 10 system review is otherwise unremarkable.    Past Medical History, Family History, and Social History reviewed.    History reviewed. No pertinent surgical history.     Family History   Problem Relation Age of Onset     Snoring Father         History reviewed. No pertinent past medical history.     Social History     Tobacco Use     Smoking status: Former Smoker     Packs/day: 0.00     Smokeless tobacco: Never Used   Substance Use Topics     Alcohol use: Not on file     Drug use: Not on file        Current Outpatient Medications   Medication Sig Dispense Refill     lisinopriL (PRINIVIL,ZESTRIL) 10 MG tablet Take 1 tablet (10 mg total) by mouth daily. 30 tablet 11     No current facility-administered medications for this visit.           Objective:    Vitals:    02/25/20 1452 02/26/20 0910   BP: (!) 140/100 (!) 138/100   Patient Site: Right Arm    Patient Position: Sitting    Cuff Size: Adult Large    Pulse: 74    SpO2: 96%    Weight: (!) 241 lb 12.8 oz (109.7 kg)       Body mass index is 34.2 kg/m .      General Appearance:  Alert, cooperative, no distress, appears stated age   HEENT:  Normal.  No acute findings.   Lungs:   Clear to auscultation bilaterally, respirations unlabored.     Heart:  Regular rate and rhythm, S1, S2 normal, no murmur, rub or gallop   Extremities: Extremities normal.  No cyanosis or edema   Skin: Warm, dry.   Neurologic: Grossly intact.           This note has been dictated using voice recognition software. Any grammatical or context distortions are unintentional and inherent to the use of this software.

## 2021-06-06 NOTE — PROGRESS NOTES
"Assessment/Plan:    1. Essential hypertension, benign  Hypertension suboptimal control.  Diastolic blood pressures often in the low 90s.  Will increase lisinopril from 10 mg up to 20 mg daily and reassess at follow-up in 4 weeks ideally otherwise anticipate physical exam 2020 as scheduled.  Ensure blood pressure goal less than 120/80 ideally and okay to have scheduled dental procedure as noted.  - lisinopriL (PRINIVIL,ZESTRIL) 20 MG tablet; Take 1 tablet (20 mg total) by mouth daily.  Dispense: 30 tablet; Refill: 5    2. KEN on CPAP  Continue CPAP for KEN management.    3. Alcohol abuse  History of alcohol abuse.  Has cut back.  Perhaps 6 drinks on  recently had more because friends were over.  Does not feel significant issue at this time.          Subjective:    Flash Nicholas is seen today for follow-up evaluation.  Recent attempted dental procedure however blood pressure elevation noted with systolic blood pressure in the 160s over likely 90s diastolic.  Blood pressures typically in the low 90s when checking outside of office.  Tolerates lisinopril 10 mg daily.  No cough or dizziness.  CPAP for KEN management.  Alcohol abuse history however has cut back from 3 cases of beer per per week to perhaps 60.  On  typically.  Had somewhat more recently 1 friends were over.  No nausea.  No abdominal pain.  No diarrhea.  Comprehensive review of systems as above otherwise all negative.     \"Dilia\" x   3 children: Fili (boy), Marcella (girl), Alban (boy)   No smoke x    EtOH: prior history of 3 cases of beer per week, now perhaps 8 beer on   Surgeries: vasectomy 06   Hospitalizations: none   Work: [employer] (james at [employer])   Mom - \"spell\" at work resulting in \"passed out\"   Dad -  45 heart attack   1 younger sister -   Maternal grandfather with h/o prostate CA and esophageal CA    History reviewed. No pertinent surgical history.     Family History "   Problem Relation Age of Onset     Snoring Father         History reviewed. No pertinent past medical history.     Social History     Tobacco Use     Smoking status: Former Smoker     Packs/day: 0.00     Smokeless tobacco: Never Used   Substance Use Topics     Alcohol use: Not on file     Drug use: Not on file        Current Outpatient Medications   Medication Sig Dispense Refill     lisinopriL (PRINIVIL,ZESTRIL) 20 MG tablet Take 1 tablet (20 mg total) by mouth daily. 30 tablet 5     No current facility-administered medications for this visit.           Objective:    Vitals:    03/10/20 1413   BP: 120/80   Pulse: 70   SpO2: 97%   Weight: (!) 242 lb (109.8 kg)      Body mass index is 34.23 kg/m .    Alert.  No apparent distress.  Chest clear.  Cardiac exam regular.  Blood pressure on recheck 126/94.  Extremities warm and dry.  No peripheral edema.      2/25/20  EKG:  Minimal voltage criteria for LVH, may be normal variant  Borderline ECG  No previous ECGs available  Confirmed by SANTOS OMER MD LOC: ([id number]) on 2/27/2020 2:19:50 PM    This note has been dictated using voice recognition software and as a result may contain minor grammatical errors and unintended word substitutions.

## 2021-06-06 NOTE — TELEPHONE ENCOUNTER
I spoke with the patient's insurance company through [id number].  They approve the renewal of the patient's CPAP supplies.  Please call the patient to give him the following number so that he can give it to the DME to get supplies.  It is valid up to May.    Order number for DME: [phone]

## 2021-06-09 NOTE — PROGRESS NOTES
ASSESSMENT/PLAN:  1. Closed nondisplaced fracture of distal phalanx of left great toe, initial encounter  45-year-old gentleman with closed nondisplaced fracture of the left great toe.  Does appear to be more of a wedge fracture and because this is great toe, and referring him to orthopedics for further recommendations.  Recommended limited weightbearing.  Ibuprofen for pain.  He will ice for 20 minutes 3 times a day and elevate.  - Ambulatory referral to Orthopedics  - XR Toe Left 2 or More VWS      Dragon dictation was used for this note.  Speech recognition errors are a possibility.    No follow-ups on file.  There are no Patient Instructions on file for this visit.    Orders Placed This Encounter   Procedures     XR Toe Left 2 or More VWS     Order Specific Question:   Specify Toe     Answer:   Great Toe     Order Specific Question:   Reason for Exam (Describe Symptoms):     Answer:   injury     Order Specific Question:   Can the procedure be changed per Radiologist protocol?     Answer:   Yes     Ambulatory referral to Orthopedics     Referral Priority:   Routine     Referral Type:   Consultation     Referral Reason:   Evaluation and Treatment     Requested Specialty:   Orthopedics     Number of Visits Requested:   1     There are no discontinued medications.      CHIEF COMPLAINT;  Chief Complaint   Patient presents with     Toe Injury     Left foot- big and 2nd toe x Sunday       HISTORY OF PRESENT ILLNESS:  Flash is a 45 y.o. male presenting to the clinic today for toe pain.  He was remodeling his bathroom and pulled the countertop of a sink down onto his toes.  Is very painful to start with and since then it is been gradually getting better.  He has quite a bit of bruising and he is concerned about the nail on the great toe.  No history of other injuries or fractures.  Ibuprofen is been working for pain.  He is otherwise healthy..    Remainder of 12-point ROS is negative.    TOBACCO USE:  Social History      Tobacco Use   Smoking Status Former Smoker     Packs/day: 0.00   Smokeless Tobacco Never Used       VITALS:  Vitals:    06/30/20 1505   BP: 126/80   Patient Site: Right Arm   Patient Position: Sitting   Cuff Size: Adult Large   Pulse: 70   SpO2: 98%   Weight: (!) 243 lb 14.4 oz (110.6 kg)     Wt Readings from Last 3 Encounters:   06/30/20 (!) 243 lb 14.4 oz (110.6 kg)   03/10/20 (!) 242 lb (109.8 kg)   02/25/20 (!) 241 lb 12.8 oz (109.7 kg)     Body mass index is 34.5 kg/m .    PHYSICAL EXAM:  GENERAL APPEARANCE: Alert, cooperative, no distress, appears stated age  Left first second and third digits show diffuse contusion and swelling.  He is unable to bend the great toe.  He has a subungual hematoma.    X-ray shows a wedge fracture of the great toe which appears to be nondisplaced.    RECENT RESULTS  No results found for this or any previous visit (from the past 48 hour(s)).    MEDICATIONS:  Current Outpatient Medications   Medication Sig Dispense Refill     lisinopriL (PRINIVIL,ZESTRIL) 20 MG tablet Take 1 tablet (20 mg total) by mouth daily. 30 tablet 5     No current facility-administered medications for this visit.

## 2021-06-11 NOTE — TELEPHONE ENCOUNTER
RN cannot approve Refill Request    RN can NOT refill this medication Protocol failed and NO refill given. Last office visit: 3/10/2020 Sathya Devi MD Last Physical: 9/3/2019 Last MTM visit: Visit date not found Last visit same specialty: 6/30/2020 Janell Oswald MD.  Next visit within 3 mo: Visit date not found  Next physical within 3 mo: Visit date not found      Nell Woodall, [facility] Triage/Med Refill 9/16/2020    Requested Prescriptions   Pending Prescriptions Disp Refills     lisinopriL (PRINIVIL,ZESTRIL) 20 MG tablet [Pharmacy Med Name: LISINOPRIL 20 MG TABLET] 30 tablet 5     Sig: TAKE 1 TABLET BY MOUTH EVERY DAY       Ace Inhibitors Refill Protocol Failed - 9/15/2020 12:38 AM        Failed - Serum Potassium in past 12 months     No results found for: LN-POTASSIUM          Failed - Serum Creatinine in past 12 months     Creatinine   Date Value Ref Range Status   09/03/2019 0.99 0.70 - 1.30 mg/dL Final             Passed - PCP or prescribing provider visit in past 12 months       Last office visit with prescriber/PCP: 3/10/2020 Sathya Devi MD OR same dept: 6/30/2020 Janell Oswald MD OR same specialty: 6/30/2020 Janell Oswald MD  Last physical: 9/3/2019 Last MTM visit: Visit date not found   Next visit within 3 mo: Visit date not found  Next physical within 3 mo: Visit date not found  Prescriber OR PCP: Sathya Devi MD  Last diagnosis associated with med order: 1. Benign essential hypertension  - lisinopriL (PRINIVIL,ZESTRIL) 20 MG tablet [Pharmacy Med Name: LISINOPRIL 20 MG TABLET]; TAKE 1 TABLET BY MOUTH EVERY DAY  Dispense: 30 tablet; Refill: 5    If protocol passes may refill for 12 months if within 3 months of last provider visit (or a total of 15 months).             Passed - Blood pressure filed in past 12 months     BP Readings from Last 1 Encounters:   06/30/20 126/80

## 2021-06-11 NOTE — PROGRESS NOTES
Assessment/Plan:     1. Routine general medical examination at a health care facility  Routine healthcare maintenance.  Preventative cares reviewed.  Seasonal flu shot provided otherwise immunizations up-to-date.  Annual physical exams to continue.    2. Class 1 obesity due to excess calories with serious comorbidity and body mass index (BMI) of 34.0 to 34.9 in adult  Dietary and exercise modifications reviewed.  9 pound weight loss since weight max of 252 pounds described.  Hopes to achieve weight goal under 230 pounds initially towards 220 pounds ideally.  Working out at the gym again.    3. Essential hypertension, benign  Continues lisinopril 20 mg daily with blood pressure 118/80 on recheck.  Med monitoring completed while on lisinopril.  - Comprehensive Metabolic Panel    4. KEN on CPAP  Tolerating CPAP and doing well.    5. Dyslipidemia  Check lipid cascade today while fasting.  Therapeutic lifestyle changes reviewed with BMI 34.37.  Weight goal less than 230 pounds initially, less than 220 pounds ideally.  - Lipid Cascade    6. Elevated ALT measurement  Has had mild ALT elevation historically.  Prior excess alcohol however has improved significantly and has not had any significant alcohol in the last month for example.  Repeat liver function testing with question hepatic steatosis with prior GGT level normal.  - Comprehensive Metabolic Panel    7. Encounter for screening for HIV  Routine HIV screen, low risk.  - HIV Antigen/Antibody Screening Cascade  - Influenza, Seasonal Quad, PF =/> 6months       The following high BMI interventions were performed this visit: encouragement to exercise, weight monitoring, weight loss from baseline weight and lifestyle education regarding diet.  Ensure ongoing efforts to achieve weight goal < 230 pounds initially, < 220 pounds ideally.              Subjective:      Flash Nicholas is a 45 y.o. male who presents for an annual exam.  In general doing well.  More active  "recently.  Has lost 9 pounds.  Would like to lose perhaps 20 pounds more.  Prior hypertension and was told to increase lisinopril from 10 mg up to 20 mg daily back in March at office visit March 10, 2020.  No cough.  No dizziness.  CPAP for KEN management.  Has mild dyslipidemia with prior HDL 38 September 3, 2019 with total cholesterol 164.  Has not had significant alcohol in the last month.  Does not feel that that is a significant concern any longer.  Denies recent illness.  Needs flu shot.  Comprehensive review of systems as above otherwise all negative.     \"Dilia\" x   3 children: Fili (boy), Marcella (girl), Alban (boy)   No smoke x    EtOH: prior history of 3 cases of beer per week, now perhaps 8 beer on   Surgeries: vasectomy 06   Hospitalizations: none   Work: [employer] (james at [employer])   Mom - \"spell\" at work resulting in \"passed out\"   Dad -  45 heart attack   1 younger sister -   Maternal grandfather with h/o prostate CA and esophageal CA    Healthy Habits:   Regular Exercise: Yes  Healthy Diet: Yes  Dental Visits Regularly: Yes  Seat Belt: Yes   Sexually active: Yes  Colonoscopy: N/A  Lipid Profile: Yes  Glucose Screen: Yes    Immunization History   Administered Date(s) Administered     Hep A, Adult IM (19yr & older) 2019     Influenza,seasonal,quad inj =/> 6months 2019     Td, Adult, Absorbed 2005     Tdap 2019     Immunization status: up to date and documented, needs flu shot.  Vision Screening:both eyes  Hearing: PASS     Current Outpatient Medications   Medication Sig Dispense Refill     lisinopriL (PRINIVIL,ZESTRIL) 20 MG tablet TAKE 1 TABLET BY MOUTH EVERY DAY 30 tablet 5     No current facility-administered medications for this visit.      History reviewed. No pertinent past medical history.  History reviewed. No pertinent surgical history.  Patient has no known allergies.  Family History   Problem Relation Age of Onset     " "Snoring Father      Social History     Socioeconomic History     Marital status:      Spouse name: Not on file     Number of children: Not on file     Years of education: Not on file     Highest education level: Not on file   Occupational History     Not on file   Social Needs     Financial resource strain: Not on file     Food insecurity     Worry: Not on file     Inability: Not on file     Transportation needs     Medical: Not on file     Non-medical: Not on file   Tobacco Use     Smoking status: Former Smoker     Packs/day: 0.00     Smokeless tobacco: Never Used   Substance and Sexual Activity     Alcohol use: Not on file     Drug use: Not on file     Sexual activity: Not on file   Lifestyle     Physical activity     Days per week: Not on file     Minutes per session: Not on file     Stress: Not on file   Relationships     Social connections     Talks on phone: Not on file     Gets together: Not on file     Attends [employer] service: Not on file     Active member of club or organization: Not on file     Attends meetings of clubs or organizations: Not on file     Relationship status: Not on file     Intimate partner violence     Fear of current or ex partner: Not on file     Emotionally abused: Not on file     Physically abused: Not on file     Forced sexual activity: Not on file   Other Topics Concern     Not on file   Social History Narrative     Not on file       Review of Systems  Comprehensive ROS: as above, otherwise all negative.           Objective:     /80   Pulse 60   Temp 97.3  F (36.3  C)   Ht 5' 10.5\" (1.791 m)   Wt (!) 243 lb (110.2 kg)   SpO2 96%   BMI 34.37 kg/m    Body mass index is 34.37 kg/m .    Physical    General Appearance:    Alert, cooperative, no distress, appears stated age.  BMI = 34.37   Head:    Normocephalic, without obvious abnormality, atraumatic   Eyes:    PERRL, conjunctiva/corneas clear, EOM's intact, fundi     benign, both eyes        Ears:    Normal TM's and " external ear canals, both ears   Nose:   Nares normal, septum midline, mucosa normal, no drainage    or sinus tenderness   Throat:   Lips, mucosa, and tongue normal; teeth and gums normal   Neck:   Supple, symmetrical, trachea midline, no adenopathy;        thyroid:  No enlargement/tenderness/nodules; no carotid    bruit or JVD   Back:     Symmetric, no curvature, ROM normal, no CVA tenderness   Lungs:     Clear to auscultation bilaterally, respirations unlabored   Chest wall:    No tenderness or deformity   Heart:    Regular rate and rhythm, S1 and S2 normal, no murmur, rub   or gallop   Abdomen:     Soft, non-tender, bowel sounds active all four quadrants,     no masses, no organomegaly.     Genitalia:    Normal male without lesion, discharge or tenderness.  No inguinal hernia noted.     Rectal:    deferred   Extremities:   Extremities normal, atraumatic, no cyanosis or edema   Pulses:   2+ and symmetric all extremities   Skin:   Skin color, texture, turgor normal, no rashes or lesions   Lymph nodes:   Cervical, supraclavicular, and axillary nodes normal   Neurologic:   CNII-XII intact. Normal strength, sensation and reflexes       throughout                This note has been dictated using voice recognition software and as a result may contain minor grammatical errors and unintended word substitutions.

## 2021-06-16 PROBLEM — F10.10 ALCOHOL ABUSE: Status: ACTIVE | Noted: 2019-12-27

## 2021-06-16 PROBLEM — E66.01 MORBID OBESITY (H): Status: ACTIVE | Noted: 2021-04-06

## 2021-06-16 PROBLEM — G47.33 OSA ON CPAP: Status: ACTIVE | Noted: 2019-12-27

## 2021-06-16 NOTE — TELEPHONE ENCOUNTER
Refill Approved    Rx renewed per Medication Renewal Policy. Medication was last renewed on 9/16/20.    Feroz Leo, [facility] Triage/Med Refill 3/25/2021     Requested Prescriptions   Pending Prescriptions Disp Refills     lisinopriL (PRINIVIL,ZESTRIL) 20 MG tablet [Pharmacy Med Name: LISINOPRIL 20 MG TABLET] 30 tablet 5     Sig: TAKE 1 TABLET BY MOUTH EVERY DAY       [facility] Refill Protocol Passed - 3/24/2021 12:30 AM        Passed - PCP or prescribing provider visit in past 12 months       Last office visit with prescriber/PCP: 3/10/2020 Sathya Devi MD OR same dept: 6/30/2020 Janell Oswald MD OR same specialty: 6/30/2020 Janell Oswald MD  Last physical: 9/24/2020 Last MTM visit: Visit date not found   Next visit within 3 mo: Visit date not found  Next physical within 3 mo: Visit date not found  Prescriber OR PCP: Sathya Devi MD  Last diagnosis associated with med order: 1. Benign essential hypertension  - lisinopriL (PRINIVIL,ZESTRIL) 20 MG tablet [Pharmacy Med Name: LISINOPRIL 20 MG TABLET]; TAKE 1 TABLET BY MOUTH EVERY DAY  Dispense: 30 tablet; Refill: 5    If protocol passes may refill for 12 months if within 3 months of last provider visit (or a total of 15 months).             Passed - Serum Potassium in past 12 months     Lab Results   Component Value Date    Potassium 4.4 09/24/2020             Passed - Blood pressure filed in past 12 months     BP Readings from Last 1 Encounters:   09/24/20 118/80             Passed - Serum Creatinine in past 12 months     Creatinine   Date Value Ref Range Status   09/24/2020 0.88 0.70 - 1.30 mg/dL Final

## 2021-06-16 NOTE — PROGRESS NOTES
Assessment/Plan:    1. Essential hypertension, benign  Hypertension fair control blood pressure 136/84.  Has nearly eliminated alcohol from his lifestyle.  Continues lisinopril 20 mg daily.  Reassess no later than yearly physical.  Med monitoring completed today.  Continue attempts at dietary and exercise modification for weight goal less than 250 pounds initially, less than 240 pounds ideally.  - Comprehensive Metabolic Panel    2. Morbid obesity (H)  History of obesity with BMI 36.21 with 13 pound weight gain since prior physical September 24, 2020.  Continue attempts at dietary and exercise modification for weight goal less than 250 pounds initially, less than 240 pounds ideally.  Continues to exercise consistently with resistance training 4 days/week and cardio activities on Sunday which includes a half an hour of elliptical .  Did encourage patient to increase cardiovascular activity to 3 days/week minimum.    3. KEN on CPAP  CPAP for KEN management.  Somewhat disrupted sleep due to CPAP machine restrictions however still feels like he is getting good sleep well utilizing CPAP.    4. Elevated ALT measurement  Has had mild ALT elevation.  Hepatitis screen completed.  Comprehensive metabolic panel obtained.  Prior GGT level normal.  Patient has eliminated alcohol from his diet nearly.  Reassess to ensure stable or improvement otherwise likely hepatic steatosis etiology with weight goal less than 250 pounds initially, less than 240 pounds ideally.  - Comprehensive Metabolic Panel  - Hepatitis Acute Evaluation      The following high BMI interventions were performed this visit: encouragement to exercise, weight monitoring, weight loss from baseline weight and lifestyle education regarding diet .  Ensure ongoing efforts to achieve weight goal < 250 pounds initially, < 240 pounds ideally.         Subjective:    Flash Nicholas is seen today for follow-up assessment.  Hypertension.  Lisinopril 20 mg daily.   "No cough or dizziness.  Physical exam 2020.  Continuing to do well.  Obesity issues historically.  Had lost 9 pounds previously however now has gained 13 pounds since physical exam.  Dyslipidemia.  Mild ALT elevation historically most recently 75.  Patient is nonfasting today.  Exercising consistent with resistance training with elliptical  use once per week.  No anabolic steroid use.  Has cut back on alcohol significantly previously up to 3 cases of beer per week then down to a beer on  and now states had a couple beer last weekend with Easter holiday otherwise not drinking typically.  Comprehensive review of systems as above otherwise all negative.     \"Dilia\" x   3 children: Fili (boy), Marcella (girl), Alban (boy)   No smoke x    EtOH: prior history of 3 cases of beer per week, now perhaps 8 beer on   Surgeries: vasectomy 06   Hospitalizations: none   Work: [employer] (james at [employer])   Mom - \"spell\" at work resulting in \"passed out\"   Dad -  45 heart attack   1 younger sister -   Maternal grandfather with h/o prostate CA and esophageal CA    History reviewed. No pertinent surgical history.     Family History   Problem Relation Age of Onset     Snoring Father         History reviewed. No pertinent past medical history.     Social History     Tobacco Use     Smoking status: Former Smoker     Packs/day: 0.00     Smokeless tobacco: Never Used   Substance Use Topics     Alcohol use: Not on file     Drug use: Not on file        Current Outpatient Medications   Medication Sig Dispense Refill     lisinopriL (PRINIVIL,ZESTRIL) 20 MG tablet TAKE 1 TABLET BY MOUTH EVERY DAY 90 tablet 1     No current facility-administered medications for this visit.           Objective:    Vitals:    21 1249   BP: 130/80   Pulse: 79   Temp: 98.1  F (36.7  C)   SpO2: 98%   Weight: (!) 256 lb (116.1 kg)      Body mass index is 36.21 kg/m .    Alert.  No apparent " distress.  Blood pressure 136/84.  Cardiac exam appears regular.  BMI 36.21.  Good muscle tone.      This note has been dictated using voice recognition software and as a result may contain minor grammatical errors and unintended word substitutions.

## 2021-06-18 NOTE — PATIENT INSTRUCTIONS - HE
"Patient Instructions by Jordin Ng DO at 2/3/2020  1:00 PM     Author: Jordin Ng DO Service: -- Author Type: Physician    Filed: 2/3/2020  1:01 PM Encounter Date: 2/3/2020 Status: Addendum    : Jordin Ng DO (Physician)    Related Notes: Original Note by Jordin Ng DO (Physician) filed at 2/3/2020 12:57 PM         What is sleep apnea?    Sleep apnea is a condition that makes you stop breathing for short periods while you are asleep. There are 2 types of sleep apnea. One is called \"obstructive sleep apnea,\" and the other is called \"central sleep apnea.\"  In obstructive sleep apnea, you stop breathing because your throat narrows or closes (figure 1). In central sleep apnea, you stop breathing because your brain does not send the right signals to your muscles to make you breathe. When people talk about sleep apnea, they are usually referring to obstructive sleep apnea, which is what this article is about.  People with sleep apnea do not know that they stop breathing when they are asleep. But they do sometimes wake up startled or gasping for breath. They also often hear from loved ones that they snore.  What are the symptoms of sleep apnea? -- The main symptoms of sleep apnea are loud snoring, tiredness, and daytime sleepiness. Other symptoms can include:  ?Restless sleep  ?Waking up choking or gasping  ?Morning headaches, dry mouth, or sore throat  ?Waking up often to urinate  ?Waking up feeling unrested or groggy  ?Trouble thinking clearly or remembering things  Some people with sleep apnea don't have symptoms, or they don't know they have them. They might figure that it's normal to be tired or to snore a lot.  Should I see a doctor or nurse? -- Yes. If you think you might have sleep apnea, see your doctor.  Is there a test for sleep apnea? -- Yes. If your doctor or nurse suspects you have sleep apnea, he or she might send you for a \"sleep study.\" Sleep studies can sometimes be done " "at home, but they are usually done in a sleep lab. For the study, you spend the night in the lab, and you are hooked up to different machines that monitor your heart rate, breathing, and other body functions. The results of the test will tell your doctor or nurse if you have the disorder.  Is there anything I can do on my own to help my sleep apnea? -- Yes. Here are some things that might help:  ?Stay off your back when sleeping. (This is not always practical, because people cannot control their position while asleep. Plus, it only helps some people.)  ?Lose weight, if you are overweight  ?Avoid alcohol, because it can make sleep apnea worse  How is sleep apnea treated? -- The most effective treatment for sleep apnea is a device that keeps your airway open while you sleep. Treatment with this device is called \"continuous positive airway pressure,\" or CPAP. People getting CPAP wear a face mask at night that keeps them breathing (figure 2).  If your doctor or nurse recommends a CPAP machine, try to be patient about using it. The mask might seem uncomfortable to wear at first, and the machine might seem noisy, but using the machine can really pay off. People with sleep apnea who use a CPAP machine feel more rested and generally feel better.  There is also another device that you wear in your mouth called an \"oral appliance\" or \"mandibular advancement device.\" It also helps keep your airway open while you sleep.  In rare cases, when nothing else helps, doctors recommend surgery to keep the airway open. Surgery to do this is not always effective, and even when it is, the problem can come back.  Is sleep apnea dangerous? -- It can be. People with sleep apnea do not get good-quality sleep, so they are often tired and not alert. This puts them at risk for car accidents and other types of accidents. Plus, studies show that people with sleep apnea are more likely than others to have high blood pressure, heart attacks, and " other serious heart problems. In people with severe sleep apnea, getting treated (for example, with a CPAP machine) can help prevent some of these problems.    GRAPHICS  Airway in a person with sleep apnea    Normally when a person sleeps, the airway remains open, and air can pass from the nose and mouth to the lungs. In a person with sleep apnea, parts of the throat and mouth drop into the airway and block off the flow of air. This can cause loud snoring and interrupt breathing for short periods.  Graphic 83032 Version 5.0    Continuous positive airway pressure (CPAP) for sleep apnea    The CPAP mask gently blows air into your nose while you sleep. It puts just enough pressure on your airway to keep it from closing. The mask in this picture fits over just the nose. Other CPAP devices have masks that fit over the nose and mouth.    Follow-up with PCP regarding elevated blood pressure:   BP Readings from Last 3 Encounters:   02/03/20 (!) 149/106   12/27/19 (!) 134/92   10/22/19 (!) 153/110

## 2021-09-19 ENCOUNTER — HEALTH MAINTENANCE LETTER (OUTPATIENT)
Age: 46
End: 2021-09-19

## 2021-09-28 ENCOUNTER — OFFICE VISIT (OUTPATIENT)
Dept: FAMILY MEDICINE | Facility: CLINIC | Age: 46
End: 2021-09-28
Payer: COMMERCIAL

## 2021-09-28 VITALS
BODY MASS INDEX: 35.56 KG/M2 | SYSTOLIC BLOOD PRESSURE: 136 MMHG | HEIGHT: 71 IN | WEIGHT: 254 LBS | OXYGEN SATURATION: 96 % | DIASTOLIC BLOOD PRESSURE: 94 MMHG | HEART RATE: 73 BPM

## 2021-09-28 DIAGNOSIS — G47.33 OSA ON CPAP: ICD-10-CM

## 2021-09-28 DIAGNOSIS — F10.10 ALCOHOL ABUSE: ICD-10-CM

## 2021-09-28 DIAGNOSIS — Z00.00 ROUTINE PHYSICAL EXAMINATION: Primary | ICD-10-CM

## 2021-09-28 DIAGNOSIS — R79.89 ABNORMAL LFTS: ICD-10-CM

## 2021-09-28 DIAGNOSIS — L30.9 DERMATITIS: ICD-10-CM

## 2021-09-28 DIAGNOSIS — E66.01 MORBID OBESITY (H): ICD-10-CM

## 2021-09-28 DIAGNOSIS — I10 ESSENTIAL HYPERTENSION, BENIGN: ICD-10-CM

## 2021-09-28 DIAGNOSIS — E78.5 DYSLIPIDEMIA: ICD-10-CM

## 2021-09-28 DIAGNOSIS — L71.9 ACNE ROSACEA: ICD-10-CM

## 2021-09-28 LAB
ALBUMIN SERPL-MCNC: 4 G/DL (ref 3.5–5)
ALP SERPL-CCNC: 53 U/L (ref 45–120)
ALT SERPL W P-5'-P-CCNC: 69 U/L (ref 0–45)
ANION GAP SERPL CALCULATED.3IONS-SCNC: 14 MMOL/L (ref 5–18)
AST SERPL W P-5'-P-CCNC: 43 U/L (ref 0–40)
BILIRUB SERPL-MCNC: 0.8 MG/DL (ref 0–1)
BUN SERPL-MCNC: 15 MG/DL (ref 8–22)
CALCIUM SERPL-MCNC: 9.6 MG/DL (ref 8.5–10.5)
CHLORIDE BLD-SCNC: 106 MMOL/L (ref 98–107)
CHOLEST SERPL-MCNC: 177 MG/DL
CO2 SERPL-SCNC: 20 MMOL/L (ref 22–31)
CREAT SERPL-MCNC: 0.73 MG/DL (ref 0.7–1.3)
ERYTHROCYTE [DISTWIDTH] IN BLOOD BY AUTOMATED COUNT: 12 % (ref 10–15)
FASTING STATUS PATIENT QL REPORTED: YES
GFR SERPL CREATININE-BSD FRML MDRD: >90 ML/MIN/1.73M2
GGT SERPL-CCNC: 62 U/L (ref 0–50)
GLUCOSE BLD-MCNC: 101 MG/DL (ref 70–125)
HBA1C MFR BLD: 5 % (ref 0–5.6)
HCT VFR BLD AUTO: 45.1 % (ref 40–53)
HDLC SERPL-MCNC: 34 MG/DL
HGB BLD-MCNC: 15.8 G/DL (ref 13.3–17.7)
LDLC SERPL CALC-MCNC: 93 MG/DL
MCH RBC QN AUTO: 31.2 PG (ref 26.5–33)
MCHC RBC AUTO-ENTMCNC: 35 G/DL (ref 31.5–36.5)
MCV RBC AUTO: 89 FL (ref 78–100)
PLATELET # BLD AUTO: 208 10E3/UL (ref 150–450)
POTASSIUM BLD-SCNC: 4.4 MMOL/L (ref 3.5–5)
PROT SERPL-MCNC: 7.1 G/DL (ref 6–8)
RBC # BLD AUTO: 5.06 10E6/UL (ref 4.4–5.9)
SODIUM SERPL-SCNC: 140 MMOL/L (ref 136–145)
TRIGL SERPL-MCNC: 251 MG/DL
WBC # BLD AUTO: 5.3 10E3/UL (ref 4–11)

## 2021-09-28 PROCEDURE — 85027 COMPLETE CBC AUTOMATED: CPT | Performed by: FAMILY MEDICINE

## 2021-09-28 PROCEDURE — 99214 OFFICE O/P EST MOD 30 MIN: CPT | Mod: 25 | Performed by: FAMILY MEDICINE

## 2021-09-28 PROCEDURE — 83036 HEMOGLOBIN GLYCOSYLATED A1C: CPT | Performed by: FAMILY MEDICINE

## 2021-09-28 PROCEDURE — 0011A PR COVID VAC MODERNA 100 MCG/0.5 ML IM: CPT | Performed by: FAMILY MEDICINE

## 2021-09-28 PROCEDURE — 99396 PREV VISIT EST AGE 40-64: CPT | Mod: 25 | Performed by: FAMILY MEDICINE

## 2021-09-28 PROCEDURE — 80053 COMPREHEN METABOLIC PANEL: CPT | Performed by: FAMILY MEDICINE

## 2021-09-28 PROCEDURE — 80061 LIPID PANEL: CPT | Performed by: FAMILY MEDICINE

## 2021-09-28 PROCEDURE — 82977 ASSAY OF GGT: CPT | Performed by: FAMILY MEDICINE

## 2021-09-28 PROCEDURE — 36415 COLL VENOUS BLD VENIPUNCTURE: CPT | Performed by: FAMILY MEDICINE

## 2021-09-28 PROCEDURE — 91301 PR COVID VAC MODERNA 100 MCG/0.5 ML IM: CPT | Performed by: FAMILY MEDICINE

## 2021-09-28 RX ORDER — BETAMETHASONE DIPROPIONATE 0.5 MG/G
OINTMENT, AUGMENTED TOPICAL
Qty: 15 G | Refills: 1 | Status: SHIPPED | OUTPATIENT
Start: 2021-09-28

## 2021-09-28 RX ORDER — LOSARTAN POTASSIUM AND HYDROCHLOROTHIAZIDE 12.5; 5 MG/1; MG/1
1 TABLET ORAL DAILY
Qty: 90 TABLET | Refills: 3 | Status: SHIPPED | OUTPATIENT
Start: 2021-09-28 | End: 2021-12-28 | Stop reason: DRUGHIGH

## 2021-09-28 ASSESSMENT — MIFFLIN-ST. JEOR: SCORE: 2064.27

## 2021-09-28 NOTE — PROGRESS NOTES
Assessment/Plan:     Routine physical examination  Routine healthcare maintenance.  Preventative cares reviewed.  Flu shot not currently available through this office and will receive at later date.  Moderna #1 and will receive booster in 4 weeks.  Annual physical exams to continue.    Essential hypertension, benign  Hypertension suboptimal control blood pressure 136/94 on recheck.  Due to some cough with lisinopril 20 mg daily, will switch to losartan hydrochlorothiazide 50/12.5 for improved blood pressure control and improvement in likely ACE inhibitor induced cough.  Med monitoring completed.  Blood pressure recheck in office no later than 3 months.  - losartan-hydrochlorothiazide (HYZAAR) 50-12.5 MG tablet  Dispense: 90 tablet; Refill: 3  - Comprehensive metabolic panel    Obesity (BMI 35.0-39.9) with comorbidity (H)  Dietary and exercise modifications reviewed for weight goal less than 240 pounds initially, less than 220 pounds ideally.  - Hemoglobin A1c    KEN on CPAP  Continue CPAP for KEN management.  Benefits described.    Alcohol abuse  No longer drinking beer.  Can drink a 1.75 L bottle of rum over the course of the weekend.  States typically drinks on Saturdays but wife does state that between Friday and Sunday can consume entire bottle.  Abstinence from alcohol recommended.  Moderation required.  Lab assessment to be updated with LFT elevation history.  Patient with DUI history.  Initially does not feel that he is an alcoholic however later states that he probably does fit the definition.    Abnormal LFTs  GGT CBC and comprehensive metabolic panels updated with need for abstinence from alcohol reviewed.  - GGT  - Comprehensive metabolic panel  - CBC with platelets    Dyslipidemia  Check lipid cascade today while fasting.  - Lipid panel reflex to direct LDL Fasting    Acne rosacea  Acne rosacea made worse by alcohol consumption.  Declines further intervention.    Dermatitis  Hand and foot dermatitis  "with dryness noted.  Diprolene AF 0.05% ointment sparingly topically to affected areas twice daily as needed.  Reassess at follow-up in 3 months.  - augmented betamethasone dipropionate (DIPROLENE-AF) 0.05 % external ointment  Dispense: 15 g; Refill: 1       The following high BMI interventions were performed this visit: encouragement to exercise, weight monitoring, weight loss from baseline weight and lifestyle education regarding diet.  Ensure ongoing efforts to achieve weight goal < 240 pounds initially, < 220 pounds ideally.           Subjective:     Flash Nicholas is a 46 year old male who presents for an annual exam.  General doing relatively well.  Does have a cough occasionally while on lisinopril 20 mg daily for hypertension management.  No longer drinking beer and instead drinks rum.  Can consume a bottle of rum 1.75 L over the course of the weekend however states typically drinks predominantly on Saturday since he has to go to work on Monday.  LFT elevation historically with likely hepatic steatosis as well as alcohol abuse concerns.  Dyslipidemia.  Has not had Covid shots and does agree to him Moderna COVID-19 vaccine series.  Would also like flu shot once available.  Redness of the left cheek more than the right cheek noted recently.  Hand dryness and scaliness that is not improved with hand lotions etc.  CPAP for KEN tolerated well.  Exercises with resistance training.  Comprehensive review of systems as above otherwise all negative.       \"Dilia\" x    3 children: Fili (boy), Marcella (girl), Alban (boy)   No smoke x    EtOH: prior history of 3 cases of beer per week, now perhaps 8 beer on Saturday   Surgeries: vasectomy 06   Hospitalizations: none   Work: [employer] (jacob at [employer])   Mom - \"spell\" at work resulting in \"passed out\"   Dad -  45 heart attack   1 younger sister -   Maternal grandfather with h/o prostate CA and esophageal CA      Healthy Habits: "   Answers for HPI/ROS submitted by the patient on 9/28/2021  Frequency of exercise:: 4-5 days/week  Getting at least 3 servings of Calcium per day:: Yes  Diet:: Regular (no restrictions)  Taking medications regularly:: No  Medication side effects:: None  Bi-annual eye exam:: NO  Dental care twice a year:: Yes  Sleep apnea or symptoms of sleep apnea:: Sleep apnea  Additional concerns today:: No  Duration of exercise:: 45-60 minutes          Immunization History   Administered Date(s) Administered     HepA-Adult 12/27/2019     Influenza Vaccine IM > 6 months Valent IIV4 (Alfuria,Fluzone) 09/24/2020     Influenza Vaccine, 6+MO IM (QUADRIVALENT W/PRESERVATIVES) 12/27/2019     Td (Adult), Adsorbed 11/01/2005     Tdap (Adacel,Boostrix) 09/03/2019     Immunization status: Moderna COVID-19 vaccine series to begin today.  Will receive flu shot once available through this office otherwise immunizations up-to-date.    Current Outpatient Medications   Medication Sig Dispense Refill     augmented betamethasone dipropionate (DIPROLENE-AF) 0.05 % external ointment Apply topically sparingly to affected areas twice daily as needed 15 g 1     losartan-hydrochlorothiazide (HYZAAR) 50-12.5 MG tablet Take 1 tablet by mouth daily 90 tablet 3     Past Medical History:   Diagnosis Date     HTN (hypertension)      KEN (obstructive sleep apnea)      No past surgical history on file.  Patient has no known allergies.  Family History   Problem Relation Age of Onset     Snoring Father      Social History     Socioeconomic History     Marital status:      Spouse name: Not on file     Number of children: Not on file     Years of education: Not on file     Highest education level: Not on file   Occupational History     Not on file   Tobacco Use     Smoking status: Former Smoker     Packs/day: 0.00     Smokeless tobacco: Never Used   Substance and Sexual Activity     Alcohol use: Not on file     Drug use: Not on file     Sexual activity: Not  "on file   Other Topics Concern     Not on file   Social History Narrative     Not on file     Social Determinants of Health     Financial Resource Strain:      Difficulty of Paying Living Expenses:    Food Insecurity:      Worried About Running Out of Food in the Last Year:      Ran Out of Food in the Last Year:    Transportation Needs:      Lack of Transportation (Medical):      Lack of Transportation (Non-Medical):    Physical Activity:      Days of Exercise per Week:      Minutes of Exercise per Session:    Stress:      Feeling of Stress :    Social Connections:      Frequency of Communication with Friends and Family:      Frequency of Social Gatherings with Friends and Family:      Attends Taoism Services:      Active Member of Clubs or Organizations:      Attends Club or Organization Meetings:      Marital Status:    Intimate Partner Violence:      Fear of Current or Ex-Partner:      Emotionally Abused:      Physically Abused:      Sexually Abused:        Review of Systems  Comprehensive ROS: as above, otherwise all negative.           Objective:     BP (!) 136/94   Pulse 73   Ht 1.803 m (5' 11\")   Wt 115.2 kg (254 lb)   SpO2 96%   BMI 35.43 kg/m    Body mass index is 35.43 kg/m .    Physical    General Appearance:    Alert, cooperative, no distress, appears stated age.  BMI 35.43.   Head:    Normocephalic, without obvious abnormality, atraumatic   Eyes:    PERRL, conjunctiva/corneas clear, EOM's intact, fundi     benign, both eyes        Ears:    Normal TM's and external ear canals, both ears   Nose:   Nares normal, septum midline, mucosa normal, no drainage    or sinus tenderness   Throat:   Lips, mucosa, and tongue normal; teeth and gums normal   Neck:   Supple, symmetrical, trachea midline, no adenopathy;        thyroid:  No enlargement/tenderness/nodules; no carotid    bruit or JVD   Back:     Symmetric, no curvature, ROM normal, no CVA tenderness   Lungs:     Clear to auscultation bilaterally, " respirations unlabored   Chest wall:    No tenderness or deformity   Heart:    Regular rate and rhythm, S1 and S2 normal, no murmur, rub   or gallop   Abdomen:     Soft, non-tender, bowel sounds active all four quadrants,     no masses, no organomegaly.     Genitalia:    Normal male without lesion, discharge or tenderness.  No inguinal hernia noted.     Rectal:    deferred   Extremities:   Extremities normal, atraumatic, no cyanosis or edema   Pulses:   2+ and symmetric all extremities   Skin:   Skin color, texture, turgor normal, no rashes or lesions.  Evidence for acne rosacea with capillary prominence on cheeks and bridge of nose.  Hand dryness and scaliness noted as well also involving feet and posterior elbows.   Lymph nodes:   Cervical, supraclavicular, and axillary nodes normal   Neurologic:   CNII-XII intact. Normal strength, sensation and reflexes       throughout                This note has been dictated using voice recognition software and as a result may contain minor grammatical errors and unintended word substitutions.

## 2021-09-28 NOTE — PROGRESS NOTES
Answers for HPI/ROS submitted by the patient on 9/28/2021  Frequency of exercise:: 4-5 days/week  Getting at least 3 servings of Calcium per day:: Yes  Diet:: Regular (no restrictions)  Taking medications regularly:: No  Medication side effects:: None  Bi-annual eye exam:: NO  Dental care twice a year:: Yes  Sleep apnea or symptoms of sleep apnea:: Sleep apnea  Additional concerns today:: No  Duration of exercise:: 45-60 minutes

## 2021-10-26 ENCOUNTER — IMMUNIZATION (OUTPATIENT)
Dept: NURSING | Facility: CLINIC | Age: 46
End: 2021-10-26
Attending: FAMILY MEDICINE
Payer: COMMERCIAL

## 2021-10-26 PROCEDURE — 91301 PR COVID VAC MODERNA 100 MCG/0.5 ML IM: CPT

## 2021-10-26 PROCEDURE — 0012A PR COVID VAC MODERNA 100 MCG/0.5 ML IM: CPT

## 2021-12-28 ENCOUNTER — OFFICE VISIT (OUTPATIENT)
Dept: FAMILY MEDICINE | Facility: CLINIC | Age: 46
End: 2021-12-28
Payer: COMMERCIAL

## 2021-12-28 VITALS
DIASTOLIC BLOOD PRESSURE: 96 MMHG | OXYGEN SATURATION: 96 % | SYSTOLIC BLOOD PRESSURE: 134 MMHG | BODY MASS INDEX: 36.12 KG/M2 | HEART RATE: 74 BPM | WEIGHT: 259 LBS

## 2021-12-28 DIAGNOSIS — E66.01 MORBID OBESITY (H): ICD-10-CM

## 2021-12-28 DIAGNOSIS — F10.10 ALCOHOL ABUSE: ICD-10-CM

## 2021-12-28 DIAGNOSIS — I10 ESSENTIAL HYPERTENSION, BENIGN: Primary | ICD-10-CM

## 2021-12-28 DIAGNOSIS — G47.33 OSA ON CPAP: ICD-10-CM

## 2021-12-28 DIAGNOSIS — R79.89 ABNORMAL LFTS: ICD-10-CM

## 2021-12-28 DIAGNOSIS — E78.5 DYSLIPIDEMIA: ICD-10-CM

## 2021-12-28 LAB
ALBUMIN SERPL-MCNC: 4.1 G/DL (ref 3.5–5)
ALP SERPL-CCNC: 57 U/L (ref 45–120)
ALT SERPL W P-5'-P-CCNC: 106 U/L (ref 0–45)
ANION GAP SERPL CALCULATED.3IONS-SCNC: 9 MMOL/L (ref 5–18)
AST SERPL W P-5'-P-CCNC: 66 U/L (ref 0–40)
BILIRUB SERPL-MCNC: 0.6 MG/DL (ref 0–1)
BUN SERPL-MCNC: 15 MG/DL (ref 8–22)
CALCIUM SERPL-MCNC: 9.9 MG/DL (ref 8.5–10.5)
CHLORIDE BLD-SCNC: 105 MMOL/L (ref 98–107)
CO2 SERPL-SCNC: 27 MMOL/L (ref 22–31)
CREAT SERPL-MCNC: 0.85 MG/DL (ref 0.7–1.3)
GFR SERPL CREATININE-BSD FRML MDRD: >90 ML/MIN/1.73M2
GGT SERPL-CCNC: 82 U/L (ref 0–50)
GLUCOSE BLD-MCNC: 97 MG/DL (ref 70–125)
POTASSIUM BLD-SCNC: 4.6 MMOL/L (ref 3.5–5)
PROT SERPL-MCNC: 7.5 G/DL (ref 6–8)
SODIUM SERPL-SCNC: 141 MMOL/L (ref 136–145)

## 2021-12-28 PROCEDURE — 36415 COLL VENOUS BLD VENIPUNCTURE: CPT | Performed by: FAMILY MEDICINE

## 2021-12-28 PROCEDURE — 99214 OFFICE O/P EST MOD 30 MIN: CPT | Performed by: FAMILY MEDICINE

## 2021-12-28 PROCEDURE — 82977 ASSAY OF GGT: CPT | Performed by: FAMILY MEDICINE

## 2021-12-28 PROCEDURE — 80053 COMPREHEN METABOLIC PANEL: CPT | Performed by: FAMILY MEDICINE

## 2021-12-28 RX ORDER — LOSARTAN POTASSIUM AND HYDROCHLOROTHIAZIDE 25; 100 MG/1; MG/1
1 TABLET ORAL DAILY
Qty: 90 TABLET | Refills: 3 | Status: SHIPPED | OUTPATIENT
Start: 2021-12-28 | End: 2022-12-20

## 2021-12-28 NOTE — PROGRESS NOTES
"Assessment/Plan:    Essential hypertension, benign  Hypertension poor control blood pressure 134/96 on recheck and will increase losartan hydrochlorothiazide 50/12.5 instead up to 100/25 daily.  Patient agrees to blood pressure recheck in office in 6 months then physical after October 1 of 2022.  Lab assessment completed today.  - losartan-hydrochlorothiazide (HYZAAR) 100-25 MG tablet  Dispense: 90 tablet; Refill: 3  - Comprehensive metabolic panel    Morbid obesity (H)  Continue attempts at dietary and exercise modification for weight goal less than 250 pounds initially, less than 240 pounds ideally with BMI of 36.12.    Abnormal LFTs  Alcohol abuse history.  LFT elevation historically.  Monitor LFTs as noted today  - Comprehensive metabolic panel  - GGT    Dyslipidemia  Dyslipidemia with therapeutic lifestyle changes reviewed    KEN on CPAP  CPAP for KEN management.    Alcohol abuse  Moderation of alcohol consumption needed.    Patient declines seasonal influenza vaccination today.    The following high BMI interventions were performed this visit: encouragement to exercise, weight monitoring, weight loss from baseline weight and lifestyle education regarding diet .  Ensure ongoing efforts to achieve weight goal < 250 pounds initially, < 240 pounds ideally.         Subjective:    Flash Nicholas is seen today for follow-up assessment.  Hypertension.  Was seen at physical September 28, 2021.  Switch from lisinopril due to cough to losartan hydrochlorothiazide 50/12.5 daily.  Cough persist without shortness of breath.  No hemoptysis issues.  Moderna second shot October 26, 2021.  Has had LFT elevation associated with alcohol consumption as well as likely component of hepatic steatosis with obesity.  CPAP for KEN management tolerated well.  Comprehensive review of systems as above otherwise all negative.     \"Dilia\" x 2006   3 children: Fili (boy), Marcella (girl), Alban (boy)   No smoke x 2007   EtOH: prior " "history of 3 cases of beer per week, now perhaps 8 beer on    Surgeries: vasectomy 06   Hospitalizations: none   Work: [employer] (james at [employer])   Mom - \"spell\" at work resulting in \"passed out\"   Dad -  45 heart attack   1 younger sister -   Maternal grandfather with h/o prostate CA and esophageal CA    No past surgical history on file.     Family History   Problem Relation Age of Onset     Snoring Father         Past Medical History:   Diagnosis Date     HTN (hypertension)      KEN (obstructive sleep apnea)         Social History     Tobacco Use     Smoking status: Former Smoker     Packs/day: 0.00     Smokeless tobacco: Never Used   Substance Use Topics     Alcohol use: None     Drug use: None        Current Outpatient Medications   Medication Sig Dispense Refill     augmented betamethasone dipropionate (DIPROLENE-AF) 0.05 % external ointment Apply topically sparingly to affected areas twice daily as needed 15 g 1     losartan-hydrochlorothiazide (HYZAAR) 100-25 MG tablet Take 1 tablet by mouth daily 90 tablet 3     losartan-hydrochlorothiazide (HYZAAR) 50-12.5 MG tablet Take 1 tablet by mouth daily 90 tablet 3          Objective:    Vitals:    21 0759 21 0842   BP: (!) 128/92 (!) 134/96   Pulse: 74    SpO2: 96%    Weight: 117.5 kg (259 lb)       Body mass index is 36.12 kg/m .    Alert.  No apparent distress with blood pressure 134/96 on recheck.  Cardiac exam regular.  Extremities warm and dry.      This note has been dictated using voice recognition software and as a result may contain minor grammatical errors and unintended word substitutions.   "

## 2022-01-25 DIAGNOSIS — G47.33 OBSTRUCTIVE SLEEP APNEA: Primary | ICD-10-CM

## 2022-06-28 ENCOUNTER — OFFICE VISIT (OUTPATIENT)
Dept: FAMILY MEDICINE | Facility: CLINIC | Age: 47
End: 2022-06-28
Payer: COMMERCIAL

## 2022-06-28 VITALS
SYSTOLIC BLOOD PRESSURE: 130 MMHG | DIASTOLIC BLOOD PRESSURE: 80 MMHG | BODY MASS INDEX: 35.29 KG/M2 | OXYGEN SATURATION: 96 % | HEART RATE: 88 BPM | WEIGHT: 253 LBS

## 2022-06-28 DIAGNOSIS — F10.10 ALCOHOL ABUSE: ICD-10-CM

## 2022-06-28 DIAGNOSIS — R79.89 ABNORMAL LFTS: ICD-10-CM

## 2022-06-28 DIAGNOSIS — I10 ESSENTIAL HYPERTENSION, BENIGN: Primary | ICD-10-CM

## 2022-06-28 DIAGNOSIS — E66.01 MORBID OBESITY (H): ICD-10-CM

## 2022-06-28 DIAGNOSIS — E78.5 DYSLIPIDEMIA: ICD-10-CM

## 2022-06-28 DIAGNOSIS — G47.33 OSA ON CPAP: ICD-10-CM

## 2022-06-28 PROCEDURE — 80053 COMPREHEN METABOLIC PANEL: CPT | Performed by: FAMILY MEDICINE

## 2022-06-28 PROCEDURE — 36415 COLL VENOUS BLD VENIPUNCTURE: CPT | Performed by: FAMILY MEDICINE

## 2022-06-28 PROCEDURE — 99214 OFFICE O/P EST MOD 30 MIN: CPT | Performed by: FAMILY MEDICINE

## 2022-06-28 PROCEDURE — 82977 ASSAY OF GGT: CPT | Performed by: FAMILY MEDICINE

## 2022-06-28 ASSESSMENT — PAIN SCALES - GENERAL: PAINLEVEL: NO PAIN (0)

## 2022-06-28 NOTE — PROGRESS NOTES
"Assessment/Plan:    Essential hypertension, benign  Hypertension improved control blood pressure 132/84 on recheck.  We will continue losartan hydrochlorothiazide 100/25 using 1 tablet daily.  Med monitoring completed today.  - Comprehensive metabolic panel    Morbid obesity (H)  Has had 6 pound weight loss since 2021 however difficult to lose with weight training activity etc.  Continue attempts at weight goal less than 240 pounds ideally.    Abnormal LFTs  LFT elevation historically and does admit to increased alcohol consumption more recently with graduation parties otherwise had been doing \"pretty good\".  We will update LFTs and GGT.  - Comprehensive metabolic panel  - GGT    KEN on CPAP  CPAP for KEN management.    Dyslipidemia  Nonfasting.  Weight goal less than 240 pounds ideally.  Reassess at physical exam in 6 months.    Alcohol abuse  Moderation of alcohol needed.        Subjective:    Flash Nicholas is seen today for follow-up assessment.  Hypertension.  Had been seen in December.  Had increase losartan hydrochlorothiazide 50/12.5 up to 100/25 daily dosing.  No side effects.  Has lost 6 pounds.  Continues resistance training.  Hard to lose a lot of weight due to caring more muscle.  History of LFT elevation.  Alcohol abuse history.  Drinking more alcohol more recently due to graduation party ceremonies otherwise had been doing pretty good prior to the last few weeks per patient.  CPAP for KEN management.  Comprehensive review of systems as above otherwise all negative.     \"Dilia\" x    3 children: Fili (boy), Marcella (girl), lAban (boy)   No smoke x    EtOH: prior history of 3 cases of beer per week, now perhaps 8 beer on    Surgeries: vasectomy 06   Hospitalizations: none   Work: [employer] (jacob at [employer])   Mom - \"spell\" at work resulting in \"passed out\"   Dad -  45 heart attack   1 younger sister -   Maternal grandfather with h/o prostate CA " and esophageal CA    No past surgical history on file.     Family History   Problem Relation Age of Onset     Snoring Father         Past Medical History:   Diagnosis Date     HTN (hypertension)      KEN (obstructive sleep apnea)         Social History     Tobacco Use     Smoking status: Former Smoker     Packs/day: 0.00     Smokeless tobacco: Never Used        Current Outpatient Medications   Medication Sig Dispense Refill     augmented betamethasone dipropionate (DIPROLENE-AF) 0.05 % external ointment Apply topically sparingly to affected areas twice daily as needed 15 g 1     losartan-hydrochlorothiazide (HYZAAR) 100-25 MG tablet Take 1 tablet by mouth daily 90 tablet 3          Objective:    Vitals:    06/28/22 1246   BP: 130/80   Pulse: 88   SpO2: 96%   Weight: 114.8 kg (253 lb)      Body mass index is 35.29 kg/m .    Alert.  No apparent distress.  Blood pressure on recheck 132/84.  Extremities warm and dry.  Pulse regular.      This note has been dictated using voice recognition software and as a result may contain minor grammatical errors and unintended word substitutions.

## 2022-06-29 LAB
ALBUMIN SERPL-MCNC: 4.2 G/DL (ref 3.5–5)
ALP SERPL-CCNC: 55 U/L (ref 45–120)
ALT SERPL W P-5'-P-CCNC: 75 U/L (ref 0–45)
ANION GAP SERPL CALCULATED.3IONS-SCNC: 13 MMOL/L (ref 5–18)
AST SERPL W P-5'-P-CCNC: 44 U/L (ref 0–40)
BILIRUB SERPL-MCNC: 0.5 MG/DL (ref 0–1)
BUN SERPL-MCNC: 16 MG/DL (ref 8–22)
CALCIUM SERPL-MCNC: 9.7 MG/DL (ref 8.5–10.5)
CHLORIDE BLD-SCNC: 106 MMOL/L (ref 98–107)
CO2 SERPL-SCNC: 22 MMOL/L (ref 22–31)
CREAT SERPL-MCNC: 0.96 MG/DL (ref 0.7–1.3)
GFR SERPL CREATININE-BSD FRML MDRD: >90 ML/MIN/1.73M2
GGT SERPL-CCNC: 62 U/L (ref 8–61)
GLUCOSE BLD-MCNC: 111 MG/DL (ref 70–125)
POTASSIUM BLD-SCNC: 3.5 MMOL/L (ref 3.5–5)
PROT SERPL-MCNC: 7.9 G/DL (ref 6–8)
SODIUM SERPL-SCNC: 141 MMOL/L (ref 136–145)

## 2022-11-21 ENCOUNTER — HEALTH MAINTENANCE LETTER (OUTPATIENT)
Age: 47
End: 2022-11-21

## 2022-12-20 DIAGNOSIS — I10 ESSENTIAL HYPERTENSION, BENIGN: ICD-10-CM

## 2022-12-20 RX ORDER — LOSARTAN POTASSIUM AND HYDROCHLOROTHIAZIDE 25; 100 MG/1; MG/1
TABLET ORAL
Qty: 90 TABLET | Refills: 1 | Status: SHIPPED | OUTPATIENT
Start: 2022-12-20 | End: 2023-06-22

## 2022-12-21 NOTE — TELEPHONE ENCOUNTER
"Last Written Prescription Date:  12/28/21  Last Fill Quantity: 90,  # refills: 3   Last office visit provider:  6/28/22     Requested Prescriptions   Pending Prescriptions Disp Refills     losartan-hydrochlorothiazide (HYZAAR) 100-25 MG tablet [Pharmacy Med Name: LOSARTAN-HCTZ 100-25 MG TAB] 90 tablet 1     Sig: TAKE 1 TABLET BY MOUTH EVERY DAY       Angiotensin-II Receptors Passed - 12/20/2022 12:35 AM        Passed - Last blood pressure under 140/90 in past 12 months     BP Readings from Last 3 Encounters:   06/28/22 130/80   12/28/21 (!) 134/96   09/28/21 (!) 136/94                 Passed - Recent (12 mo) or future (30 days) visit within the authorizing provider's specialty     Patient has had an office visit with the authorizing provider or a provider within the authorizing providers department within the previous 12 mos or has a future within next 30 days. See \"Patient Info\" tab in inbasket, or \"Choose Columns\" in Meds & Orders section of the refill encounter.              Passed - Medication is active on med list        Passed - Patient is age 18 or older        Passed - Normal serum creatinine on file in past 12 months     Recent Labs   Lab Test 06/28/22  1312   CR 0.96       Ok to refill medication if creatinine is low          Passed - Normal serum potassium on file in past 12 months     Recent Labs   Lab Test 06/28/22  1312   POTASSIUM 3.5                   Diuretics (Including Combos) Protocol Passed - 12/20/2022 12:35 AM        Passed - Blood pressure under 140/90 in past 12 months     BP Readings from Last 3 Encounters:   06/28/22 130/80   12/28/21 (!) 134/96   09/28/21 (!) 136/94                 Passed - Recent (12 mo) or future (30 days) visit within the authorizing provider's specialty     Patient has had an office visit with the authorizing provider or a provider within the authorizing providers department within the previous 12 mos or has a future within next 30 days. See \"Patient Info\" tab in " "inbasket, or \"Choose Columns\" in Meds & Orders section of the refill encounter.              Passed - Medication is active on med list        Passed - Patient is age 18 or older        Passed - Normal serum creatinine on file in past 12 months     Recent Labs   Lab Test 06/28/22  1312   CR 0.96              Passed - Normal serum potassium on file in past 12 months     Recent Labs   Lab Test 06/28/22  1312   POTASSIUM 3.5                    Passed - Normal serum sodium on file in past 12 months     Recent Labs   Lab Test 06/28/22  1312                      Nell Woodall, RN 12/20/22 9:30 PM  "

## 2022-12-28 ENCOUNTER — OFFICE VISIT (OUTPATIENT)
Dept: FAMILY MEDICINE | Facility: CLINIC | Age: 47
End: 2022-12-28
Attending: FAMILY MEDICINE
Payer: COMMERCIAL

## 2022-12-28 VITALS
WEIGHT: 254 LBS | HEART RATE: 90 BPM | BODY MASS INDEX: 35.56 KG/M2 | OXYGEN SATURATION: 97 % | SYSTOLIC BLOOD PRESSURE: 136 MMHG | HEIGHT: 71 IN | RESPIRATION RATE: 19 BRPM | DIASTOLIC BLOOD PRESSURE: 86 MMHG

## 2022-12-28 DIAGNOSIS — E78.5 DYSLIPIDEMIA: ICD-10-CM

## 2022-12-28 DIAGNOSIS — F10.10 ALCOHOL ABUSE: ICD-10-CM

## 2022-12-28 DIAGNOSIS — Z11.9 SCREENING EXAMINATION FOR INFECTIOUS DISEASE: ICD-10-CM

## 2022-12-28 DIAGNOSIS — R79.89 ABNORMAL LFTS: ICD-10-CM

## 2022-12-28 DIAGNOSIS — I10 ESSENTIAL HYPERTENSION, BENIGN: ICD-10-CM

## 2022-12-28 DIAGNOSIS — G47.33 OSA ON CPAP: ICD-10-CM

## 2022-12-28 DIAGNOSIS — E66.01 MORBID OBESITY (H): ICD-10-CM

## 2022-12-28 DIAGNOSIS — Z86.16 HISTORY OF COVID-19: ICD-10-CM

## 2022-12-28 DIAGNOSIS — M77.11 RIGHT LATERAL EPICONDYLITIS: ICD-10-CM

## 2022-12-28 DIAGNOSIS — Z12.11 SCREEN FOR COLON CANCER: ICD-10-CM

## 2022-12-28 DIAGNOSIS — Z00.00 ROUTINE PHYSICAL EXAMINATION: Primary | ICD-10-CM

## 2022-12-28 DIAGNOSIS — Z23 ENCOUNTER FOR IMMUNIZATION: ICD-10-CM

## 2022-12-28 LAB
ALBUMIN SERPL BCG-MCNC: 4.5 G/DL (ref 3.5–5.2)
ALP SERPL-CCNC: 57 U/L (ref 40–129)
ALT SERPL W P-5'-P-CCNC: 93 U/L (ref 10–50)
ANION GAP SERPL CALCULATED.3IONS-SCNC: 15 MMOL/L (ref 7–15)
AST SERPL W P-5'-P-CCNC: 64 U/L (ref 10–50)
BILIRUB SERPL-MCNC: 0.4 MG/DL
BUN SERPL-MCNC: 17.1 MG/DL (ref 6–20)
CALCIUM SERPL-MCNC: 9.8 MG/DL (ref 8.6–10)
CHLORIDE SERPL-SCNC: 100 MMOL/L (ref 98–107)
CHOLEST SERPL-MCNC: 242 MG/DL
CREAT SERPL-MCNC: 0.9 MG/DL (ref 0.67–1.17)
DEPRECATED HCO3 PLAS-SCNC: 22 MMOL/L (ref 22–29)
GFR SERPL CREATININE-BSD FRML MDRD: >90 ML/MIN/1.73M2
GGT SERPL-CCNC: 83 U/L (ref 8–61)
GLUCOSE SERPL-MCNC: 110 MG/DL (ref 70–99)
HBV SURFACE AB SERPL IA-ACNC: 0.29 M[IU]/ML
HBV SURFACE AB SERPL IA-ACNC: NONREACTIVE M[IU]/ML
HDLC SERPL-MCNC: 31 MG/DL
LDLC SERPL CALC-MCNC: ABNORMAL MG/DL
LDLC SERPL DIRECT ASSAY-MCNC: 95 MG/DL
NONHDLC SERPL-MCNC: 211 MG/DL
POTASSIUM SERPL-SCNC: 3.9 MMOL/L (ref 3.4–5.3)
PROT SERPL-MCNC: 7.5 G/DL (ref 6.4–8.3)
SODIUM SERPL-SCNC: 137 MMOL/L (ref 136–145)
TRIGL SERPL-MCNC: 515 MG/DL

## 2022-12-28 PROCEDURE — 80053 COMPREHEN METABOLIC PANEL: CPT | Performed by: FAMILY MEDICINE

## 2022-12-28 PROCEDURE — 83721 ASSAY OF BLOOD LIPOPROTEIN: CPT | Mod: 59 | Performed by: FAMILY MEDICINE

## 2022-12-28 PROCEDURE — 99214 OFFICE O/P EST MOD 30 MIN: CPT | Mod: 25 | Performed by: FAMILY MEDICINE

## 2022-12-28 PROCEDURE — 90632 HEPA VACCINE ADULT IM: CPT | Performed by: FAMILY MEDICINE

## 2022-12-28 PROCEDURE — 99396 PREV VISIT EST AGE 40-64: CPT | Mod: 25 | Performed by: FAMILY MEDICINE

## 2022-12-28 PROCEDURE — 80061 LIPID PANEL: CPT | Performed by: FAMILY MEDICINE

## 2022-12-28 PROCEDURE — 36415 COLL VENOUS BLD VENIPUNCTURE: CPT | Performed by: FAMILY MEDICINE

## 2022-12-28 PROCEDURE — 82977 ASSAY OF GGT: CPT | Performed by: FAMILY MEDICINE

## 2022-12-28 PROCEDURE — 86706 HEP B SURFACE ANTIBODY: CPT | Performed by: FAMILY MEDICINE

## 2022-12-28 PROCEDURE — 90471 IMMUNIZATION ADMIN: CPT | Performed by: FAMILY MEDICINE

## 2022-12-28 ASSESSMENT — ENCOUNTER SYMPTOMS
FEVER: 0
HEMATURIA: 0
JOINT SWELLING: 0
FREQUENCY: 0
HEMATOCHEZIA: 0
DIZZINESS: 0
WEAKNESS: 0
MYALGIAS: 0
EYE PAIN: 0
NAUSEA: 0
CHILLS: 0
DIARRHEA: 0
ABDOMINAL PAIN: 0
HEADACHES: 0
ARTHRALGIAS: 0
HEARTBURN: 0
SHORTNESS OF BREATH: 0
DYSURIA: 0
CONSTIPATION: 0
PALPITATIONS: 0
SORE THROAT: 0
COUGH: 0
PARESTHESIAS: 0
NERVOUS/ANXIOUS: 0

## 2022-12-28 ASSESSMENT — PAIN SCALES - GENERAL: PAINLEVEL: NO PAIN (0)

## 2022-12-28 NOTE — PROGRESS NOTES
Assessment/Plan:     Routine physical examination  Routine healthcare maintenance.  Preventative cares reviewed.  Annual physical exams to continue.    Obesity (BMI 35.0-39.9) with comorbidity (H)  Dietary and exercise modifications for weight goal less than 240 pounds initially, less than 230 pounds ideally.  Works out on a consistent basis at the gym.    Essential hypertension, benign  Hypertension appears fair control blood pressure 136/86 and continuing losartan hydrochlorothiazide 100/25 daily and will focus on dietary and exercise modifications as noted above as well as continue management for obstructive sleep apnea as well as need for moderation of alcohol consumption.  Reassess at follow-up in approximately 6 months.    Dyslipidemia  Dyslipidemia.  Lipid cascade updated today while fasting.  Therapeutic lifestyle changes reviewed as noted above.  - Lipid panel reflex to direct LDL Fasting    Abnormal LFTs  LFT elevation historically.  Mild.  Moderation of alcohol consumption needed.  Update GGT and LFTs today.  Prior CBC normal.  - GGT  - Comprehensive metabolic panel    KEN on CPAP  Continue CPAP for KEN management.    Alcohol abuse  Alcohol abuse concerns reviewed.  Does not drink on the days that he goes to the gym but there are weeks where he does not go to the gym therefore drinking daily during that period of time.  Moderate alcohol to no more than 2 ounces daily.    Screen for colon cancer  Colon cancer screening to be completed.  Colonoscopy referral placed.  - Colonoscopy Screening  Referral    History of COVID-19  History of COVID-19 infection with described late October 2022 and will consider bivalent booster greater than 90 days following.    Encounter for immunization  Hepatitis A vaccine booster provided.  Declines seasonal flu shot.  - HEPATITIS A VACCINE (ADULT)    Screening examination for infectious disease  Check for hepatitis B immunity otherwise consider hepatitis B  "series.  - Hepatitis B Surface Antibody    Right lateral epicondylitis  Patient is left-handed.  Avoidance of exacerbating triggers.  Ensure palm held in a supinated position with lifting activities etc.  Tennis elbow band.  Notify of persistent or worsening concerns.         Subjective:     Flash Nicholas is a 47 year old male who presents for an annual exam.  Traveling to the [facility] in February.  Tanning currently 3 times per week.  Needs hepatitis A booster.  Underlying history of hypertension treated with losartan hydrochlorothiazide 100/25 daily.  Dyslipidemia.  LFT elevation historically.  Excess alcohol consumption.  Had cut back significantly to perhaps 8 beer on a  but now admits to drinking on days where he does not go to the gym and his wife states that there will be weeks where he does not go.  Patient with right elbow pain more lateral aspect.  Ongoing concern.  Tried tennis elbow band OTC.  No significant benefit.  Is left-handed.  Has not had prior colonoscopy.  CPAP for KEN management.  Had COVID infection end of 2022 described as mild symptoms.  Comprehensive review of systems as above otherwise all negative.       \"Dilia\" x    3 children: Fili (boy), Marcella (girl), Alban (boy)   No smoke x    EtOH: prior history of 3 cases of beer per week, now perhaps 8 beer on    Surgeries: vasectomy 06   Hospitalizations: none   Work: [employer] (james at [employer])   Mom - \"spell\" at work resulting in \"passed out\"   Dad -  45 heart attack   1 younger sister -   Maternal grandfather with h/o prostate CA and esophageal CA      Healthy Habits:     Getting at least 3 servings of Calcium per day:  Yes    Bi-annual eye exam:  NO    Dental care twice a year:  Yes    Sleep apnea or symptoms of sleep apnea:  Sleep apnea    Diet:  Regular (no restrictions)    Frequency of exercise:  4-5 days/week    Duration of exercise:  30-45 minutes    Taking medications " regularly:  Yes    Medication side effects:  None    PHQ-2 Total Score: 0    Additional concerns today:  No        Answers for HPI/ROS submitted by the patient on 12/28/2022  abdominal pain: No  Blood in stool: No  Blood in urine: No  chest pain: No  chills: No  congestion: No  constipation: No  cough: No  diarrhea: No  dizziness: No  ear pain: No  eye pain: No  nervous/anxious: No  fever: No  frequency: No  genital sores: No  headaches: No  hearing loss: No  heartburn: No  arthralgias: No  joint swelling: No  peripheral edema: No  mood changes: No  myalgias: No  nausea: No  dysuria: No  palpitations: No  Skin sensation changes: No  sore throat: No  urgency: No  rash: No  shortness of breath: No  visual disturbance: No  weakness: No  impotence: No  penile discharge: No         Immunization History   Administered Date(s) Administered     COVID-19 Vaccine 18+ (Moderna) 09/28/2021, 10/26/2021     HepA-Adult 12/27/2019     Influenza Vaccine >6 months (Alfuria,Fluzone) 09/24/2020     Influenza Vaccine, 6+MO IM (QUADRIVALENT W/PRESERVATIVES) 12/27/2019     Td (Adult), Adsorbed 11/01/2005     Tdap (Adacel,Boostrix) 09/03/2019     Immunization status: Hepatitis A booster needed.  Declines seasonal flu shot.  Just had COVID infection end of October 2022.    Current Outpatient Medications   Medication Sig Dispense Refill     augmented betamethasone dipropionate (DIPROLENE-AF) 0.05 % external ointment Apply topically sparingly to affected areas twice daily as needed 15 g 1     losartan-hydrochlorothiazide (HYZAAR) 100-25 MG tablet TAKE 1 TABLET BY MOUTH EVERY DAY 90 tablet 1     Past Medical History:   Diagnosis Date     HTN (hypertension)      KEN (obstructive sleep apnea)      No past surgical history on file.  Patient has no known allergies.  Family History   Problem Relation Age of Onset     Snoring Father      Social History     Socioeconomic History     Marital status:      Spouse name: Not on file     Number of  "children: Not on file     Years of education: Not on file     Highest education level: Not on file   Occupational History     Not on file   Tobacco Use     Smoking status: Former     Packs/day: 0.00     Types: Cigarettes     Smokeless tobacco: Never   Substance and Sexual Activity     Alcohol use: Not on file     Drug use: Not on file     Sexual activity: Not on file   Other Topics Concern     Not on file   Social History Narrative     Not on file     Social Determinants of Health     Financial Resource Strain: Not on file   Food Insecurity: Not on file   Transportation Needs: Not on file   Physical Activity: Not on file   Stress: Not on file   Social Connections: Not on file   Intimate Partner Violence: Not on file   Housing Stability: Not on file       Review of Systems  Comprehensive ROS: as above, otherwise all negative.           Objective:     /86   Pulse 90   Resp 19   Ht 1.803 m (5' 11\")   Wt 115.2 kg (254 lb)   SpO2 97%   BMI 35.43 kg/m    Body mass index is 35.43 kg/m .    Physical    General Appearance:    Alert, cooperative, no distress, appears stated age.  BMI 35.43   Head:    Normocephalic, without obvious abnormality, atraumatic   Eyes:    PERRL, conjunctiva/corneas clear, EOM's intact, fundi     benign, both eyes        Ears:    Normal TM's and external ear canals, both ears   Nose:   Nares normal, septum midline, mucosa normal, no drainage    or sinus tenderness   Throat:   Lips, mucosa, and tongue normal; teeth and gums normal   Neck:   Supple, symmetrical, trachea midline, no adenopathy;        thyroid:  No enlargement/tenderness/nodules; no carotid    bruit or JVD   Back:     Symmetric, no curvature, ROM normal, no CVA tenderness   Lungs:     Clear to auscultation bilaterally, respirations unlabored   Chest wall:    No tenderness or deformity   Heart:    Regular rate and rhythm, S1 and S2 normal, no murmur, rub   or gallop   Abdomen:     Soft, non-tender, bowel sounds active all four " quadrants,     no masses, no organomegaly.     Genitalia:    Normal male without lesion, discharge or tenderness.  No inguinal hernia noted.     Rectal:    deferred   Extremities:   Extremities normal, atraumatic, no cyanosis or edema.  Right lateral epicondyle tenderness with increased discomfort with resisted wrist flexion.   Pulses:   2+ and symmetric all extremities   Skin:   Skin color, texture, turgor normal, no rashes or lesions   Lymph nodes:   Cervical, supraclavicular, and axillary nodes normal   Neurologic:   CNII-XII intact. Normal strength, sensation and reflexes       throughout                This note has been dictated using voice recognition software and as a result may contain minor grammatical errors and unintended word substitutions.

## 2022-12-30 ENCOUNTER — ALLIED HEALTH/NURSE VISIT (OUTPATIENT)
Dept: FAMILY MEDICINE | Facility: CLINIC | Age: 47
End: 2022-12-30
Payer: COMMERCIAL

## 2022-12-30 DIAGNOSIS — Z23 NEED FOR VACCINATION: Primary | ICD-10-CM

## 2022-12-30 PROCEDURE — 90471 IMMUNIZATION ADMIN: CPT

## 2022-12-30 PROCEDURE — 90746 HEPB VACCINE 3 DOSE ADULT IM: CPT

## 2022-12-30 PROCEDURE — 99207 PR NO CHARGE NURSE ONLY: CPT

## 2022-12-30 NOTE — PROGRESS NOTES
Chief Complaint   Patient presents with     Imm/Inj     Hep B     Prior to immunization administration, verified patients identity using patient s name and date of birth. Please see Immunization Activity for additional information.     Screening Questionnaire for Adult Immunization    Are you sick today?   No   Do you have allergies to medications, food, a vaccine component or latex?   No   Have you ever had a serious reaction after receiving a vaccination?   No   Do you have a long-term health problem with heart, lung, kidney, or metabolic disease (e.g., diabetes), asthma, a blood disorder, no spleen, complement component deficiency, a cochlear implant, or a spinal fluid leak?  Are you on long-term aspirin therapy?   No   Do you have cancer, leukemia, HIV/AIDS, or any other immune system problem?   No   Do you have a parent, brother, or sister with an immune system problem?   No   In the past 3 months, have you taken medications that affect  your immune system, such as prednisone, other steroids, or anticancer drugs; drugs for the treatment of rheumatoid arthritis, Crohn s disease, or psoriasis; or have you had radiation treatments?   No   Have you had a seizure, or a brain or other nervous system problem?   No   During the past year, have you received a transfusion of blood or blood    products, or been given immune (gamma) globulin or antiviral drug?   No   For women: Are you pregnant or is there a chance you could become       pregnant during the next month?   No   Have you received any vaccinations in the past 4 weeks?   No     Immunization questionnaire answers were all negative.        Per orders of Dr. Hep B, injection of Devi given by Leesa De Jesus CMA. Patient instructed to remain in clinic for 15 minutes afterwards, and to report any adverse reaction to me immediately.       Screening performed by Leesa De Jesus CMA on 12/30/2022 at 8:30 AM.

## 2023-01-31 ENCOUNTER — ALLIED HEALTH/NURSE VISIT (OUTPATIENT)
Dept: FAMILY MEDICINE | Facility: CLINIC | Age: 48
End: 2023-01-31
Payer: COMMERCIAL

## 2023-01-31 DIAGNOSIS — Z23 NEED FOR HEPATITIS VACCINATION: Primary | ICD-10-CM

## 2023-01-31 PROCEDURE — 99207 PR NO CHARGE NURSE ONLY: CPT

## 2023-01-31 PROCEDURE — 90471 IMMUNIZATION ADMIN: CPT

## 2023-01-31 PROCEDURE — 90746 HEPB VACCINE 3 DOSE ADULT IM: CPT

## 2023-01-31 NOTE — PROGRESS NOTES
Prior to immunization administration, verified patients identity using patient s name and date of birth. Please see Immunization Activity for additional information.     Screening Questionnaire for Adult Immunization    Are you sick today?   No   Do you have allergies to medications, food, a vaccine component or latex?   No   Have you ever had a serious reaction after receiving a vaccination?   No   Do you have a long-term health problem with heart, lung, kidney, or metabolic disease (e.g., diabetes), asthma, a blood disorder, no spleen, complement component deficiency, a cochlear implant, or a spinal fluid leak?  Are you on long-term aspirin therapy?   No   Do you have cancer, leukemia, HIV/AIDS, or any other immune system problem?   No   Do you have a parent, brother, or sister with an immune system problem?   No   In the past 3 months, have you taken medications that affect  your immune system, such as prednisone, other steroids, or anticancer drugs; drugs for the treatment of rheumatoid arthritis, Crohn s disease, or psoriasis; or have you had radiation treatments?   No   Have you had a seizure, or a brain or other nervous system problem?   No   During the past year, have you received a transfusion of blood or blood    products, or been given immune (gamma) globulin or antiviral drug?   No   For women: Are you pregnant or is there a chance you could become       pregnant during the next month?   No   Have you received any vaccinations in the past 4 weeks?   No     Immunization questionnaire answers were all negative.        . Patient instructed to remain in clinic for 15 minutes afterwards, and to report any adverse reaction to me immediately.       Screening performed by Flash Eason CMA on 1/31/2023 at 3:28 PM.

## 2023-02-16 DIAGNOSIS — G47.33 OBSTRUCTIVE SLEEP APNEA: Primary | ICD-10-CM

## 2023-05-18 ENCOUNTER — TRANSFERRED RECORDS (OUTPATIENT)
Dept: HEALTH INFORMATION MANAGEMENT | Facility: CLINIC | Age: 48
End: 2023-05-18
Payer: COMMERCIAL

## 2023-06-22 DIAGNOSIS — I10 ESSENTIAL HYPERTENSION, BENIGN: ICD-10-CM

## 2023-06-22 RX ORDER — LOSARTAN POTASSIUM AND HYDROCHLOROTHIAZIDE 25; 100 MG/1; MG/1
TABLET ORAL
Qty: 90 TABLET | Refills: 1 | Status: SHIPPED | OUTPATIENT
Start: 2023-06-22 | End: 2023-12-27

## 2023-06-22 NOTE — TELEPHONE ENCOUNTER
"Last Written Prescription Date:  12/20/22  Last Fill Quantity: 90,  # refills: 1   Last office visit provider:   12/28/22    Requested Prescriptions   Pending Prescriptions Disp Refills     losartan-hydrochlorothiazide (HYZAAR) 100-25 MG tablet [Pharmacy Med Name: LOSARTAN-HCTZ 100-25 MG TAB] 90 tablet 1     Sig: TAKE 1 TABLET BY MOUTH EVERY DAY       Angiotensin-II Receptors Passed - 6/22/2023 12:45 AM        Passed - Last blood pressure under 140/90 in past 12 months     BP Readings from Last 3 Encounters:   12/28/22 136/86   06/28/22 130/80   12/28/21 (!) 134/96                 Passed - Recent (12 mo) or future (30 days) visit within the authorizing provider's specialty     Patient has had an office visit with the authorizing provider or a provider within the authorizing providers department within the previous 12 mos or has a future within next 30 days. See \"Patient Info\" tab in [email or web address], or \"Choose Columns\" in Meds & Orders section of the refill encounter.              Passed - Medication is active on med list        Passed - Patient is age 18 or older        Passed - Normal serum creatinine on file in past 12 months     Recent Labs   Lab Test 12/28/22  0932   CR 0.90       Ok to refill medication if creatinine is low          Passed - Normal serum potassium on file in past 12 months     Recent Labs   Lab Test 12/28/22  0932   POTASSIUM 3.9                   Diuretics (Including Combos) Protocol Passed - 6/22/2023 12:45 AM        Passed - Blood pressure under 140/90 in past 12 months     BP Readings from Last 3 Encounters:   12/28/22 136/86   06/28/22 130/80   12/28/21 (!) 134/96                 Passed - Recent (12 mo) or future (30 days) visit within the authorizing provider's specialty     Patient has had an office visit with the authorizing provider or a provider within the authorizing providers department within the previous 12 mos or has a future within next 30 days. See \"Patient Info\" tab in [email or web address], or " "\"Choose Columns\" in Meds & Orders section of the refill encounter.              Passed - Medication is active on med list        Passed - Patient is age 18 or older        Passed - Normal serum creatinine on file in past 12 months     Recent Labs   Lab Test 12/28/22  0932   CR 0.90              Passed - Normal serum potassium on file in past 12 months     Recent Labs   Lab Test 12/28/22  0932   POTASSIUM 3.9                    Passed - Normal serum sodium on file in past 12 months     Recent Labs   Lab Test 12/28/22  0932                      Colleen Wong RN 06/22/23 1:53 AM  "

## 2023-07-03 ENCOUNTER — ALLIED HEALTH/NURSE VISIT (OUTPATIENT)
Dept: FAMILY MEDICINE | Facility: CLINIC | Age: 48
End: 2023-07-03
Payer: COMMERCIAL

## 2023-07-03 DIAGNOSIS — Z23 NEED FOR VACCINATION: Primary | ICD-10-CM

## 2023-07-03 PROCEDURE — 90746 HEPB VACCINE 3 DOSE ADULT IM: CPT

## 2023-07-03 PROCEDURE — 90471 IMMUNIZATION ADMIN: CPT

## 2023-07-03 PROCEDURE — 99207 PR NO CHARGE NURSE ONLY: CPT

## 2023-07-03 NOTE — PROGRESS NOTES
Prior to immunization administration, verified patients identity using patient s name and date of birth. Please see Immunization Activity for additional information.     Screening Questionnaire for Adult Immunization    Are you sick today?   No   Do you have allergies to medications, food, a vaccine component or latex?   No   Have you ever had a serious reaction after receiving a vaccination?   No   Do you have a long-term health problem with heart, lung, kidney, or metabolic disease (e.g., diabetes), asthma, a blood disorder, no spleen, complement component deficiency, a cochlear implant, or a spinal fluid leak?  Are you on long-term aspirin therapy?   No   Do you have cancer, leukemia, HIV/AIDS, or any other immune system problem?   No   Do you have a parent, brother, or sister with an immune system problem?   No   In the past 3 months, have you taken medications that affect  your immune system, such as prednisone, other steroids, or anticancer drugs; drugs for the treatment of rheumatoid arthritis, Crohn s disease, or psoriasis; or have you had radiation treatments?   No   Have you had a seizure, or a brain or other nervous system problem?   No   During the past year, have you received a transfusion of blood or blood    products, or been given immune (gamma) globulin or antiviral drug?   No   For women: Are you pregnant or is there a chance you could become       pregnant during the next month?   No   Have you received any vaccinations in the past 4 weeks?   No     Immunization questionnaire answers were all negative.    I have reviewed the following standing orders:   This patient is due and qualifies for the Hepatitis B vaccine.    Click here for Hepatitis B Standing Order    I have reviewed the vaccines inclusion and exclusion criteria; No concerns regarding eligibility.         Patient instructed to remain in clinic for 15 minutes afterwards, and to report any adverse reactions.     Screening performed by  SAMEER CABRERA MA on 7/3/2023 at 1:46 PM.

## 2023-11-28 ENCOUNTER — PATIENT OUTREACH (OUTPATIENT)
Dept: CARE COORDINATION | Facility: CLINIC | Age: 48
End: 2023-11-28
Payer: COMMERCIAL

## 2023-12-12 ENCOUNTER — PATIENT OUTREACH (OUTPATIENT)
Dept: CARE COORDINATION | Facility: CLINIC | Age: 48
End: 2023-12-12
Payer: COMMERCIAL

## 2023-12-23 DIAGNOSIS — I10 ESSENTIAL HYPERTENSION, BENIGN: ICD-10-CM

## 2023-12-27 RX ORDER — LOSARTAN POTASSIUM AND HYDROCHLOROTHIAZIDE 25; 100 MG/1; MG/1
TABLET ORAL
Qty: 90 TABLET | Refills: 1 | Status: SHIPPED | OUTPATIENT
Start: 2023-12-27 | End: 2024-06-24

## 2024-02-04 ENCOUNTER — HEALTH MAINTENANCE LETTER (OUTPATIENT)
Age: 49
End: 2024-02-04

## 2024-06-22 DIAGNOSIS — I10 ESSENTIAL HYPERTENSION, BENIGN: ICD-10-CM

## 2024-06-24 RX ORDER — LOSARTAN POTASSIUM AND HYDROCHLOROTHIAZIDE 25; 100 MG/1; MG/1
TABLET ORAL
Qty: 90 TABLET | Refills: 0 | Status: SHIPPED | OUTPATIENT
Start: 2024-06-24 | End: 2024-09-20

## 2024-06-25 ENCOUNTER — PATIENT OUTREACH (OUTPATIENT)
Dept: CARE COORDINATION | Facility: CLINIC | Age: 49
End: 2024-06-25
Payer: COMMERCIAL

## 2024-08-08 DIAGNOSIS — G47.33 OBSTRUCTIVE SLEEP APNEA (ADULT) (PEDIATRIC): Primary | ICD-10-CM

## 2024-08-25 SDOH — HEALTH STABILITY: PHYSICAL HEALTH: ON AVERAGE, HOW MANY DAYS PER WEEK DO YOU ENGAGE IN MODERATE TO STRENUOUS EXERCISE (LIKE A BRISK WALK)?: 3 DAYS

## 2024-08-25 SDOH — HEALTH STABILITY: PHYSICAL HEALTH: ON AVERAGE, HOW MANY MINUTES DO YOU ENGAGE IN EXERCISE AT THIS LEVEL?: 60 MIN

## 2024-08-25 ASSESSMENT — SOCIAL DETERMINANTS OF HEALTH (SDOH): HOW OFTEN DO YOU GET TOGETHER WITH FRIENDS OR RELATIVES?: PATIENT DECLINED

## 2024-08-28 ENCOUNTER — OFFICE VISIT (OUTPATIENT)
Dept: FAMILY MEDICINE | Facility: CLINIC | Age: 49
End: 2024-08-28
Payer: COMMERCIAL

## 2024-08-28 VITALS
TEMPERATURE: 98 F | SYSTOLIC BLOOD PRESSURE: 126 MMHG | RESPIRATION RATE: 15 BRPM | BODY MASS INDEX: 35.98 KG/M2 | WEIGHT: 257 LBS | OXYGEN SATURATION: 98 % | HEART RATE: 75 BPM | HEIGHT: 71 IN | DIASTOLIC BLOOD PRESSURE: 88 MMHG

## 2024-08-28 DIAGNOSIS — I10 ESSENTIAL HYPERTENSION, BENIGN: ICD-10-CM

## 2024-08-28 DIAGNOSIS — E66.812 CLASS 2 SEVERE OBESITY WITH SERIOUS COMORBIDITY AND BODY MASS INDEX (BMI) OF 36.0 TO 36.9 IN ADULT, UNSPECIFIED OBESITY TYPE (H): ICD-10-CM

## 2024-08-28 DIAGNOSIS — R73.01 IMPAIRED FASTING GLUCOSE: ICD-10-CM

## 2024-08-28 DIAGNOSIS — E55.9 VITAMIN D INSUFFICIENCY: ICD-10-CM

## 2024-08-28 DIAGNOSIS — D12.6 TUBULAR ADENOMA OF COLON: ICD-10-CM

## 2024-08-28 DIAGNOSIS — R79.89 ABNORMAL LFTS: ICD-10-CM

## 2024-08-28 DIAGNOSIS — Z00.00 ROUTINE PHYSICAL EXAMINATION: Primary | ICD-10-CM

## 2024-08-28 DIAGNOSIS — G47.33 OSA ON CPAP: ICD-10-CM

## 2024-08-28 DIAGNOSIS — F10.10 ALCOHOL ABUSE: ICD-10-CM

## 2024-08-28 DIAGNOSIS — E66.01 CLASS 2 SEVERE OBESITY WITH SERIOUS COMORBIDITY AND BODY MASS INDEX (BMI) OF 36.0 TO 36.9 IN ADULT, UNSPECIFIED OBESITY TYPE (H): ICD-10-CM

## 2024-08-28 DIAGNOSIS — E78.5 HYPERLIPIDEMIA LDL GOAL <100: ICD-10-CM

## 2024-08-28 PROCEDURE — 99396 PREV VISIT EST AGE 40-64: CPT | Performed by: FAMILY MEDICINE

## 2024-08-28 PROCEDURE — 99214 OFFICE O/P EST MOD 30 MIN: CPT | Mod: 25 | Performed by: FAMILY MEDICINE

## 2024-08-28 ASSESSMENT — PAIN SCALES - GENERAL: PAINLEVEL: NO PAIN (0)

## 2024-08-28 NOTE — PROGRESS NOTES
Assessment/Plan:     Routine physical examination  Routine healthcare maintenance.  Preventative cares reviewed.  Annual physical exams to continue.    Class 2 severe obesity with serious comorbidity and body mass index (BMI) of 36.0 to 36.9 in adult, unspecified obesity type (H)  Dietary and exercise modifications reviewed for weight goal less than 240 pounds initially, less than 220 pounds ideally.    Essential hypertension, benign  Hypertension appears fair control blood pressure on recheck however 126/88 after examination today and will reassess at follow-up no later than 6 months for goal less than 130/80 ideally.  Need for abstinence from alcohol reviewed.  Continues losartan hydrochlorothiazide 100/25 daily.    KEN on CPAP  Utilizing CPAP for KEN management and tolerating well.    Alcohol abuse  Alcohol abuse concerns ongoing with 3-4 drinks per day however each drink contains about 4 shots of alcohol.  Cranberry Vodka or cranberry Rum type drinks described.  We did place mental health  referral for chemical dependency assessment.  Patient agrees to this with ongoing attempts at complete abstinence recommended.  - Adult Mental Health  Referral    Abnormal LFTs  Has had LFT elevations associate with alcohol abuse concerns and will update CBC, comprehensive metabolic panel and GGT level.  Abstinence from alcohol recommended.  Patient will have meeting for CD assessment as well.  - CBC with platelets  - Comprehensive metabolic panel  - GGT    Impaired fasting glucose  Update A1c and fasting glucose at earliest convenience with prior fasting glucose 110 December 28, 2022.  Nonfasting status today and will likely have labs drawn through [facility] location.  - Hemoglobin A1c    Hyperlipidemia LDL goal <100  Update lipid cascade while fasting at earliest convenience.  - Lipid panel reflex to direct LDL Fasting    Vitamin D insufficiency  Patient's wife interested in ensuring vitamin D  "level is okay and no evidence of vitamin D insufficiency at [facility].  - Vitamin D Deficiency    Tubular adenoma of colon  Tubular adenoma colonoscopy May 18, 2023 and told to repeat at 7-year interval.       The following high BMI interventions were performed this visit: encouragement to exercise, weight monitoring, weight loss from baseline weight, and lifestyle education regarding diet.  Ensure ongoing efforts to achieve weight goal < 240 pounds initially, < 220 pounds ideally.           Subjective:     Flash Nicholas is a 49 year old male who presents for an annual exam.  Continues to drink excess alcohol.  3-4 drinks per day but each drink contains about 4 shots of alcohol likely.  Has not gone through treatment in the past.  Can go perhaps 2 weeks without alcohol at times and then resumes.  Cranberry vodka or cranberry Rum drinks up to 4 shots per drink at least.  Has had LFT elevation historically.  Treated for hypertension with losartan hydrochlorothiazide 100/25 daily.  Hyperlipidemia as well.  Fasting glucose previously 110.  Tubular adenoma colonoscopy May 18, 2023 with 7-year follow-up recommendation.  Comprehensive review of systems as above otherwise all negative.       \"Dilia\" x    3 children: Fili (boy), Marcella (girl), Alban (boy)   No smoke x    EtOH: prior history of 3 cases of beer per week, now perhaps 8 beer on    Surgeries: vasectomy 06   Hospitalizations: none   Work: [employer] (james at [employer])   Mom - \"spell\" at work resulting in \"passed out\"   Dad -  45 heart attack   1 younger sister -   Maternal grandfather with h/o prostate CA and esophageal CA       Healthy Habits:   HPI     Immunization History   Administered Date(s) Administered    COVID-19 Monovalent 18+ (Moderna) 2021, 10/26/2021    DT (PEDS <7y) 2005    Hepatitis A (ADULT 19+) 2019, 2022    Hepatitis B, Adult 2022, 2023, 2023    Influenza " (IIV3) PF 11/11/2013, 11/04/2014    Influenza Vaccine >6 months,quad, PF 09/24/2020    Influenza Vaccine, 6+MO IM (QUADRIVALENT W/PRESERVATIVES) 11/03/2015, 10/10/2017, 12/27/2019    TDAP (Adacel,Boostrix) 09/03/2019    Td (Adult), Adsorbed 11/01/2005     Immunization status:  UTD    Current Outpatient Medications   Medication Sig Dispense Refill    augmented betamethasone dipropionate (DIPROLENE-AF) 0.05 % external ointment Apply topically sparingly to affected areas twice daily as needed 15 g 1    losartan-hydrochlorothiazide (HYZAAR) 100-25 MG tablet TAKE 1 TABLET BY MOUTH EVERY DAY 90 tablet 0     Past Medical History:   Diagnosis Date    HTN (hypertension)     KEN (obstructive sleep apnea)      No past surgical history on file.  Patient has no known allergies.  Family History   Problem Relation Age of Onset    Snoring Father      Social History     Socioeconomic History    Marital status:      Spouse name: Not on file    Number of children: Not on file    Years of education: Not on file    Highest education level: Not on file   Occupational History    Not on file   Tobacco Use    Smoking status: Former     Types: Cigarettes    Smokeless tobacco: Never   Substance and Sexual Activity    Alcohol use: Not on file    Drug use: Not on file    Sexual activity: Not on file   Other Topics Concern    Not on file   Social History Narrative    Not on file     Social Determinants of Health     Financial Resource Strain: Low Risk  (8/25/2024)    Financial Resource Strain     Within the past 12 months, have you or your family members you live with been unable to get utilities (heat, electricity) when it was really needed?: No   Food Insecurity: Low Risk  (8/25/2024)    Food Insecurity     Within the past 12 months, did you worry that your food would run out before you got money to buy more?: No     Within the past 12 months, did the food you bought just not last and you didn t have money to get more?: No  "  Transportation Needs: Low Risk  (8/25/2024)    Transportation Needs     Within the past 12 months, has lack of transportation kept you from medical appointments, getting your medicines, non-medical meetings or appointments, work, or from getting things that you need?: No   Physical Activity: Sufficiently Active (8/25/2024)    Exercise Vital Sign     Days of Exercise per Week: 3 days     Minutes of Exercise per Session: 60 min   Stress: No Stress Concern Present (8/25/2024)    [employer] - Occupational Stress Questionnaire     Feeling of Stress : Not at all   Social Connections: Unknown (8/25/2024)    Social Connection and Isolation Panel [NHANES]     Frequency of Communication with Friends and Family: Not on file     Frequency of Social Gatherings with Friends and Family: Patient declined     Attends Gnosticist Services: Not on file     Active Member of Clubs or Organizations: Not on file     Attends Club or Organization Meetings: Not on file     Marital Status: Not on file   Interpersonal Safety: Low Risk  (8/28/2024)    Interpersonal Safety     Do you feel physically and emotionally safe where you currently live?: Yes     Within the past 12 months, have you been hit, slapped, kicked or otherwise physically hurt by someone?: No     Within the past 12 months, have you been humiliated or emotionally abused in other ways by your partner or ex-partner?: No   Housing Stability: Low Risk  (8/25/2024)    Housing Stability     Do you have housing? : Yes     Are you worried about losing your housing?: No       Review of Systems  Comprehensive ROS: as above, otherwise all negative.           Objective:     /88   Pulse 75   Temp 98  F (36.7  C)   Resp 15   Ht 1.791 m (5' 10.5\")   Wt 116.6 kg (257 lb)   SpO2 98%   BMI 36.35 kg/m    Body mass index is 36.35 kg/m .    Physical    General Appearance:    Alert, cooperative, no distress, appears stated age.  BMI 36.35.   Head:    " Normocephalic, without obvious abnormality, atraumatic   Eyes:    PERRL, conjunctiva/corneas clear, EOM's intact, fundi     benign, both eyes        Ears:    Normal TM's and external ear canals, both ears   Nose:   Nares normal, septum midline, mucosa normal, no drainage    or sinus tenderness   Throat:   Lips, mucosa, and tongue normal; teeth and gums normal   Neck:   Supple, symmetrical, trachea midline, no adenopathy;        thyroid:  No enlargement/tenderness/nodules; no carotid    bruit or JVD   Back:     Symmetric, no curvature, ROM normal, no CVA tenderness   Lungs:     Clear to auscultation bilaterally, respirations unlabored   Chest wall:    No tenderness or deformity   Heart:    Regular rate and rhythm, S1 and S2 normal, no murmur, rub   or gallop   Abdomen:     Soft, non-tender, bowel sounds active all four quadrants,     no masses, no organomegaly.     Genitalia:    Normal male without lesion, discharge or tenderness.  No inguinal hernia noted.     Rectal:    deferred   Extremities:   Extremities normal, atraumatic, no cyanosis or edema   Pulses:   2+ and symmetric all extremities   Skin:   Skin color, texture, turgor normal, no rashes or lesions   Lymph nodes:   Cervical, supraclavicular, and axillary nodes normal   Neurologic:   CNII-XII intact. Normal strength, sensation and reflexes       throughout                This note has been dictated using voice recognition software and as a result may contain minor grammatical errors and unintended word substitutions.

## 2024-08-29 ENCOUNTER — PATIENT OUTREACH (OUTPATIENT)
Dept: GASTROENTEROLOGY | Facility: CLINIC | Age: 49
End: 2024-08-29
Payer: COMMERCIAL

## 2024-09-06 ENCOUNTER — LAB (OUTPATIENT)
Dept: LAB | Facility: CLINIC | Age: 49
End: 2024-09-06
Payer: COMMERCIAL

## 2024-09-06 DIAGNOSIS — R79.89 ABNORMAL LFTS: ICD-10-CM

## 2024-09-06 DIAGNOSIS — E78.5 HYPERLIPIDEMIA LDL GOAL <100: ICD-10-CM

## 2024-09-06 DIAGNOSIS — R73.01 IMPAIRED FASTING GLUCOSE: ICD-10-CM

## 2024-09-06 DIAGNOSIS — E55.9 VITAMIN D INSUFFICIENCY: ICD-10-CM

## 2024-09-06 LAB
ALBUMIN SERPL BCG-MCNC: 4.6 G/DL (ref 3.5–5.2)
ALP SERPL-CCNC: 71 U/L (ref 40–150)
ALT SERPL W P-5'-P-CCNC: 107 U/L (ref 0–70)
ANION GAP SERPL CALCULATED.3IONS-SCNC: 14 MMOL/L (ref 7–15)
AST SERPL W P-5'-P-CCNC: 72 U/L (ref 0–45)
BILIRUB SERPL-MCNC: 0.5 MG/DL
BUN SERPL-MCNC: 19.9 MG/DL (ref 6–20)
CALCIUM SERPL-MCNC: 9.6 MG/DL (ref 8.8–10.4)
CHLORIDE SERPL-SCNC: 98 MMOL/L (ref 98–107)
CHOLEST SERPL-MCNC: 237 MG/DL
CREAT SERPL-MCNC: 0.86 MG/DL (ref 0.67–1.17)
EGFRCR SERPLBLD CKD-EPI 2021: >90 ML/MIN/1.73M2
ERYTHROCYTE [DISTWIDTH] IN BLOOD BY AUTOMATED COUNT: 11.8 % (ref 10–15)
FASTING STATUS PATIENT QL REPORTED: NO
FASTING STATUS PATIENT QL REPORTED: NO
GGT SERPL-CCNC: 152 U/L (ref 8–61)
GLUCOSE SERPL-MCNC: 131 MG/DL (ref 70–99)
HBA1C MFR BLD: 5.6 % (ref 0–5.6)
HCO3 SERPL-SCNC: 24 MMOL/L (ref 22–29)
HCT VFR BLD AUTO: 47.6 % (ref 40–53)
HDLC SERPL-MCNC: 30 MG/DL
HGB BLD-MCNC: 16.4 G/DL (ref 13.3–17.7)
LDLC SERPL CALC-MCNC: ABNORMAL MG/DL
LDLC SERPL DIRECT ASSAY-MCNC: 100 MG/DL
MCH RBC QN AUTO: 31.2 PG (ref 26.5–33)
MCHC RBC AUTO-ENTMCNC: 34.5 G/DL (ref 31.5–36.5)
MCV RBC AUTO: 91 FL (ref 78–100)
NONHDLC SERPL-MCNC: 207 MG/DL
PLATELET # BLD AUTO: 193 10E3/UL (ref 150–450)
POTASSIUM SERPL-SCNC: 3.6 MMOL/L (ref 3.4–5.3)
PROT SERPL-MCNC: 7.7 G/DL (ref 6.4–8.3)
RBC # BLD AUTO: 5.26 10E6/UL (ref 4.4–5.9)
SODIUM SERPL-SCNC: 136 MMOL/L (ref 135–145)
TRIGL SERPL-MCNC: 643 MG/DL
VIT D+METAB SERPL-MCNC: 26 NG/ML (ref 20–50)
WBC # BLD AUTO: 8.4 10E3/UL (ref 4–11)

## 2024-09-06 PROCEDURE — 85027 COMPLETE CBC AUTOMATED: CPT

## 2024-09-06 PROCEDURE — 80053 COMPREHEN METABOLIC PANEL: CPT

## 2024-09-06 PROCEDURE — 36415 COLL VENOUS BLD VENIPUNCTURE: CPT

## 2024-09-06 PROCEDURE — 83721 ASSAY OF BLOOD LIPOPROTEIN: CPT | Mod: XU

## 2024-09-06 PROCEDURE — 82306 VITAMIN D 25 HYDROXY: CPT

## 2024-09-06 PROCEDURE — 82977 ASSAY OF GGT: CPT

## 2024-09-06 PROCEDURE — 83036 HEMOGLOBIN GLYCOSYLATED A1C: CPT

## 2024-09-06 PROCEDURE — 80061 LIPID PANEL: CPT

## 2024-09-20 DIAGNOSIS — I10 ESSENTIAL HYPERTENSION, BENIGN: ICD-10-CM

## 2024-09-20 RX ORDER — LOSARTAN POTASSIUM AND HYDROCHLOROTHIAZIDE 25; 100 MG/1; MG/1
TABLET ORAL
Qty: 90 TABLET | Refills: 2 | Status: SHIPPED | OUTPATIENT
Start: 2024-09-20

## 2024-11-13 ASSESSMENT — SLEEP AND FATIGUE QUESTIONNAIRES
HOW LIKELY ARE YOU TO NOD OFF OR FALL ASLEEP WHEN YOU ARE A PASSENGER IN A CAR FOR AN HOUR WITHOUT A BREAK: SLIGHT CHANCE OF DOZING
HOW LIKELY ARE YOU TO NOD OFF OR FALL ASLEEP WHILE WATCHING TV: SLIGHT CHANCE OF DOZING
HOW LIKELY ARE YOU TO NOD OFF OR FALL ASLEEP WHILE SITTING INACTIVE IN A PUBLIC PLACE: WOULD NEVER DOZE
HOW LIKELY ARE YOU TO NOD OFF OR FALL ASLEEP WHILE LYING DOWN TO REST IN THE AFTERNOON WHEN CIRCUMSTANCES PERMIT: HIGH CHANCE OF DOZING
HOW LIKELY ARE YOU TO NOD OFF OR FALL ASLEEP WHILE SITTING AND READING: MODERATE CHANCE OF DOZING
HOW LIKELY ARE YOU TO NOD OFF OR FALL ASLEEP WHILE SITTING QUIETLY AFTER LUNCH WITHOUT ALCOHOL: WOULD NEVER DOZE
HOW LIKELY ARE YOU TO NOD OFF OR FALL ASLEEP IN A CAR, WHILE STOPPED FOR A FEW MINUTES IN TRAFFIC: WOULD NEVER DOZE
HOW LIKELY ARE YOU TO NOD OFF OR FALL ASLEEP WHILE SITTING AND TALKING TO SOMEONE: WOULD NEVER DOZE

## 2024-11-14 ENCOUNTER — VIRTUAL VISIT (OUTPATIENT)
Dept: SLEEP MEDICINE | Facility: CLINIC | Age: 49
End: 2024-11-14
Payer: COMMERCIAL

## 2024-11-14 VITALS — BODY MASS INDEX: 35.49 KG/M2 | WEIGHT: 262 LBS | HEIGHT: 72 IN

## 2024-11-14 DIAGNOSIS — E66.01 MORBID OBESITY (H): ICD-10-CM

## 2024-11-14 DIAGNOSIS — G47.33 OBSTRUCTIVE SLEEP APNEA: Primary | ICD-10-CM

## 2024-11-14 PROCEDURE — 99203 OFFICE O/P NEW LOW 30 MIN: CPT | Mod: 95 | Performed by: INTERNAL MEDICINE

## 2024-11-14 ASSESSMENT — PAIN SCALES - GENERAL: PAINLEVEL_OUTOF10: NO PAIN (0)

## 2024-11-14 NOTE — PROGRESS NOTES
Virtual Visit Details    Type of service:  Video Visit     Originating Location (pt. Location): Home  Distant Location (provider location):  Off-site  Platform used for Video Visit: mEgo    Additional 15 minutes on the date of service was spent performing the following:    -Preparing to see the patient  -Obtaining and/or reviewing separately obtained history   -Ordering medications, tests, or procedures   -Documenting clinical information in the electronic or other health record     Thank you for the opportunity to participate in the care of Flash Nicholas.     He is a 49 year old y/o male patient who comes to the sleep medicine clinic for re-establishing care. The patient was diagnosed with KEN on 11/07/2019 (AHI=48.3). The patient states that he continues to benefit from CPAP therapy and has no complaints.      Assessment and Plan:  In summary Flash Nicholas is a 49 year old year old male who is here for follow up.    1. Obstructive sleep apnea (Primary)  I congratulated the patient on his excellent CPAP usage. I will keep him on the same pressure settings as per his request.  - COMPREHENSIVE DME    2. Obesity (BMI 35.0-39.9) with comorbidity (H)  Healthy weigh management is recommended as part of the long term goal to improve KEN. I will give the patient a hand out on the topic in the AVS.    Compliance Download data for 30 Days:  Compliance:100%  Pressure setting:APAP 5-15 cwp  95% pressure:10.4 cwp  Leak:Minimal  Residual AHI:0.5 events per hour  Mask Tolerance:Good  Skin irritation:None  DME:[facility]    Lab reviewed: Discussed with patient.    Sleep-Wake Cycle:    TIME IN BED:    1) Work/School Days:    Do you work or go to school? Yes   What time do you usually get into bed? 7:30   About how long does it take you to fall asleep? 2 minutes   How often do you have trouble falling asleep? None   How often do you wake up during the night? Couple of times per week   Do you work  days/evenings/nights/rotating shifts? Days   What wakes you up at night? Use the bathroom   How often do you have trouble falling back to sleep? None   About how long does it take to fall back to sleep? 2 minutes   What do you usually do if you have trouble getting back to sleep? Wife rubs back   What time do you usually get out of bed to start your day? 2:30   Do you use an alarm? Yes   2) Weekends/Non-work Days/All Other Days    What time do you usually get into bed? 8:00pm   About how long does it take you to fall asleep? 2 minutes   What time do you usually get out of bed to start your day? 7:00am   Do you use an alarm? No   SLEEP NEED    On average, about how much sleep do you think you get? 7 hours   About how much sleep do you think you need? 6-8 hours   SLEEP POSITION    Which sleep positions do you prefer? Back    Side   Do you do any of the following activities in bed?    How often do you take a nap on purpose? 0   About how long are your naps? Short   Do you feel better after naps? Yes   How often do you doze off unintentionally? 1 day   Have you ever had a driving accident or near-miss due to sleepiness/drowsiness? No       MONIE:  MONIE Total Score: (Patient-Rptd) 11  Total score - [place]: (Patient-Rptd) 7 (11/13/2024  7:15 PM)    Failed to redirect to the Timeline version of the [employer] SmartLink.   Patient Active Problem List   Diagnosis    KEN on CPAP    Alcohol abuse    Obesity (BMI 35.0-39.9) with comorbidity (H)       Past Medical History:   Diagnosis Date    HTN (hypertension)     KEN (obstructive sleep apnea)        No past surgical history on file.    Current Outpatient Medications   Medication Sig Dispense Refill    augmented betamethasone dipropionate (DIPROLENE-AF) 0.05 % external ointment Apply topically sparingly to affected areas twice daily as needed 15 g 1    losartan-hydrochlorothiazide (HYZAAR) 100-25 MG tablet TAKE 1 TABLET BY MOUTH EVERY DAY 90 tablet 2       No Known Allergies    Visual   Exam:  GEN: NAD,  Psych: normal mood, normal affect    Labs/Studies:    Lab Results   Component Value Date     (H) 09/06/2024     (H) 12/28/2022     Lab Results   Component Value Date    HGB 16.4 09/06/2024    HGB 15.8 09/28/2021     Lab Results   Component Value Date    BUN 19.9 09/06/2024    BUN 17.1 12/28/2022    CR 0.86 09/06/2024    CR 0.90 12/28/2022     Lab Results   Component Value Date    AST 72 (H) 09/06/2024    AST 64 (H) 12/28/2022     (H) 09/06/2024    ALT 93 (H) 12/28/2022     (H) 09/06/2024    GGT 83 (H) 12/28/2022    ALKPHOS 71 09/06/2024    ALKPHOS 57 12/28/2022    BILITOTAL 0.5 09/06/2024    BILITOTAL 0.4 12/28/2022       Recent Labs   Lab Test 09/06/24  0823 12/28/22  0932    137   POTASSIUM 3.6 3.9   CHLORIDE 98 100   CO2 24 22   ANIONGAP 14 15   * 110*   BUN 19.9 17.1   CR 0.86 0.90   CINDY 9.6 9.8       I reviewed the efficacy and compliance report from his device. Data summarized on the HPI and the PAP compliance flow sheet.     Patient was strongly advised to avoid driving, operating any heavy machinery or other hazardous situations while drowsy or sleepy. Patient was counseled on the importance of driving while alert, to pull over if drowsy, or nap before getting into the vehicle if sleepy.     Options for treatment and follow-up care were reviewed with the patient and/or guardian. Patient and/or guardian engaged in the decision making process and verbalized understanding of the options discussed and agreed with the final plan.     Patient verbalized understanding of these issues, agrees with the plan and all questions were answered today. Patient was given an opportuntity to voice any other symptoms or concerns not listed above. Patient did not have any other symptoms or concerns.      Jordin Ng DO  Board Certified in Internal Medicine and Sleep Medicine    (Note created with Dragon voice recognition and unintended spelling errors and word  substitutions may occur)     Audio and visual devices were used for this virtual clinic visit with permission from patient.

## 2024-11-14 NOTE — NURSING NOTE
Current patient location: [address] [place] [id number]    Is the patient currently in the state of [place]? YES    Visit mode:VIDEO    If the visit is dropped, the patient can be reconnected by:VIDEO VISIT: Text to cell phone:   Telephone Information:   Mobile [phone]       Will anyone else be joining the visit? NO  (If patient encounters technical issues they should call [phone]    Are changes needed to the allergy or medication list? Pt stated no changes to allergies and Pt stated no med changes    Are refills needed on medications prescribed by this physician? NO    Rooming Documentation:  Questionnaire(s) completed    Reason for visit: Consult    Manuela BORREGOF

## 2025-07-28 ENCOUNTER — OFFICE VISIT (OUTPATIENT)
Dept: DERMATOLOGY | Facility: CLINIC | Age: 50
End: 2025-07-28
Payer: COMMERCIAL

## 2025-07-28 DIAGNOSIS — D23.9 DERMAL NEVUS: Primary | ICD-10-CM

## 2025-07-28 DIAGNOSIS — D18.01 ANGIOMA OF SKIN: ICD-10-CM

## 2025-07-28 DIAGNOSIS — L81.4 LENTIGO: ICD-10-CM

## 2025-07-28 DIAGNOSIS — L82.1 SEBORRHEIC KERATOSES: ICD-10-CM

## 2025-07-28 PROCEDURE — 99202 OFFICE O/P NEW SF 15 MIN: CPT | Performed by: DERMATOLOGY

## 2025-07-28 NOTE — PROGRESS NOTES
Flash Nicholas is an extremely pleasant 50 year old year old male patient here today for spot on left temporal scalp.  Patient has no other skin complaints today.  Remainder of the HPI, Meds, PMH, Allergies, FH, and SH was reviewed in chart.      Past Medical History:   Diagnosis Date    HTN (hypertension)     KEN (obstructive sleep apnea)        History reviewed. No pertinent surgical history.     Family History   Problem Relation Age of Onset    Snoring Father        Social History     Socioeconomic History    Marital status:      Spouse name: Not on file    Number of children: Not on file    Years of education: Not on file    Highest education level: Not on file   Occupational History    Not on file   Tobacco Use    Smoking status: Former     Types: Cigarettes    Smokeless tobacco: Never   Substance and Sexual Activity    Alcohol use: Not on file    Drug use: Not on file    Sexual activity: Not on file   Other Topics Concern    Not on file   Social History Narrative    Not on file     Social Drivers of Health     Financial Resource Strain: Low Risk  (8/25/2024)    Financial Resource Strain     Within the past 12 months, have you or your family members you live with been unable to get utilities (heat, electricity) when it was really needed?: No   Food Insecurity: Low Risk  (8/25/2024)    Food Insecurity     Within the past 12 months, did you worry that your food would run out before you got money to buy more?: No     Within the past 12 months, did the food you bought just not last and you didn t have money to get more?: No   Transportation Needs: Low Risk  (8/25/2024)    Transportation Needs     Within the past 12 months, has lack of transportation kept you from medical appointments, getting your medicines, non-medical meetings or appointments, work, or from getting things that you need?: No   Physical Activity: Sufficiently Active (8/25/2024)    Exercise Vital Sign     Days of Exercise per Week: 3 days      Minutes of Exercise per Session: 60 min   Stress: No Stress Concern Present (8/25/2024)    [employer] [place] [employer] - Occupational Stress Questionnaire     Feeling of Stress : Not at all   Social Connections: Unknown (8/25/2024)    Social Connection and Isolation Panel [NHANES]     Frequency of Communication with Friends and Family: Not on file     Frequency of Social Gatherings with Friends and Family: Patient declined     Attends Cheondoism Services: Not on file     Active Member of Clubs or Organizations: Not on file     Attends Club or Organization Meetings: Not on file     Marital Status: Not on file   Interpersonal Safety: Low Risk  (3/28/2025)    Interpersonal Safety     Do you feel physically and emotionally safe where you currently live?: Yes     Within the past 12 months, have you been hit, slapped, kicked or otherwise physically hurt by someone?: No     Within the past 12 months, have you been humiliated or emotionally abused in other ways by your partner or ex-partner?: No   Housing Stability: Low Risk  (8/25/2024)    Housing Stability     Do you have housing? : Yes     Are you worried about losing your housing?: No       Outpatient Encounter Medications as of 7/28/2025   Medication Sig Dispense Refill    losartan-hydrochlorothiazide (HYZAAR) 100-25 MG tablet Take 1 tablet by mouth daily. 90 tablet 1     No facility-administered encounter medications on file as of 7/28/2025.             O:   NAD, WDWN, Alert & Oriented, Mood & Affect wnl, Vitals stable   General appearance normal   Vitals stable   Alert, oriented and in no acute distress     L temporal scalp stuck on papule     Stuck on papules and brown macules on trunk and ext   Red papules on scalp   Flesh colored papules on scalp          Eyes: Conjunctivae/lids:Normal     ENT: Lips, mucosa: normal    MSK:Normal    Cardiovascular: peripheral edema none    Pulm: Breathing Normal    Neuro/Psych: Orientation:Alert and Orientedx3 ;  Mood/Affect:normal       A/P:  1. Seborrheic keratosis, lentigo, angioma, dermal nevus  It was a pleasure speaking to Flash Nicholas today.  Previous clinic notes and pertinent laboratory tests were reviewed prior to Flash Nicholas's visit.    Nature and genetics of benign skin lesions dicussed with patient.  Patient encouraged to perform monthly skin exams.  UV precautions reviewed with patient.  Return to clinic prn

## 2025-07-28 NOTE — PATIENT INSTRUCTIONS
Proper skin care from [facility]:    -Eliminate harsh soaps as they strip the natural oils from the skin, often resulting in dry itchy skin ( i.e. Dial, Zest, Mongolian Spring)  -Use mild soaps such as Cetaphil or Dove Sensitive Skin in the shower. You do not need to use soap on arms, legs, and trunk every time you shower unless visibly soiled.   -Avoid hot or cold showers.  -After showering, lightly (pat) dry off and apply moisturizing within 2-3 minutes. This will help trap moisture in the skin.   -Aggressive use of a moisturizer at least 1-2 times a day to the entire body (including -Vanicream, Cetaphil, Aquaphor or Cerave) and moisturize hands after every washing.  -We recommend using moisturizers that come in a tub that needs to be scooped out, not a pump. This has more of an oil base. It will hold moisture in your skin much better than a water base moisturizer. The above recommended are non-pore clogging.      Wear a sunscreen with at least SPF 30 on your face, ears, neck and V of the chest daily. Wear sunscreen on other areas of the body if those areas are exposed to the sun throughout the day. Sunscreens can contain physical and/or chemical blockers. Physical blockers are less likely to clog pores, these include zinc oxide and titanium dioxide. Reapply every two hour and after swimming.     Sunscreen examples: https://www.ewg.org/sunscreen/    UV radiation  UVA radiation remains constant throughout the day and throughout the year. It is a longer wavelength than UVB and therefore penetrates deeper into the skin leading to immediate and delayed tanning, photoaging, and skin cancer. 70-80% of UVA and UVB radiation occurs between the hours of 10am-2pm.  UVB radiation  UVB radiation causes the most harmful effects and is more significant during the summer months. However, snow and ice can reflect UVB radiation leading to skin damage during the winter months as well. UVB radiation is responsible for  tanning, burning, inflammation, delayed erythema (pinkness), pigmentation (brown spots), and skin cancer.     I recommend self monthly full body exams and yearly full body exams with a dermatology provider. If you develop a new or changing lesion please follow up for examination. Most skin cancers are pink and scaly or pink and pearly. However, we do see blue/brown/black skin cancers.  Consider the ABCDEs of melanoma when giving yourself your monthly full body exam ( don't forget the groin, buttocks, feet, toes, etc). A-asymmetry, B-borders, C-color, D-diameter, E-elevation or evolving. If you see any of these changes please follow up in clinic. If you cannot see your back I recommend purchasing a hand held mirror to use with a larger wall mirror.       Checking for Skin Cancer  You can find cancer early by checking your skin each month. There are 3 kinds of skin cancer. They are melanoma, basal cell carcinoma, and squamous cell carcinoma. Doing monthly skin checks is the best way to find new marks or skin changes. Follow the instructions below for checking your skin.   The ABCDEs of checking moles for melanoma   Check your moles or growths for signs of melanoma using ABCDE:   Asymmetry: the sides of the mole or growth don t match  Border: the edges are ragged, notched, or blurred  Color: the color within the mole or growth varies  Diameter: the mole or growth is larger than 6 mm (size of a pencil eraser)  Evolving: the size, shape, or color of the mole or growth is changing (evolving is not shown in the images below)    Checking for other types of skin cancer  Basal cell carcinoma or squamous cell carcinoma have symptoms such as:     A spot or mole that looks different from all other marks on your skin  Changes in how an area feels, such as itching, tenderness, or pain  Changes in the skin's surface, such as oozing, bleeding, or scaliness  A sore that does not heal  New swelling or redness beyond the border of a  mole    Who s at risk?  Anyone can get skin cancer. But you are at greater risk if you have:   Fair skin, light-colored hair, or light-colored eyes  Many moles or abnormal moles on your skin  A history of sunburns from sunlight or tanning beds  A family history of skin cancer  A history of exposure to radiation or chemicals  A weakened immune system  If you have had skin cancer in the past, you are at risk for recurring skin cancer.   How to check your skin  Do your monthly skin checkups in front of a full-length mirror. Check all parts of your body, including your:   Head (ears, face, neck, and scalp)  Torso (front, back, and sides)  Arms (tops, undersides, upper, and lower armpits)  Hands (palms, backs, and fingers, including under the nails)  Buttocks and genitals  Legs (front, back, and sides)  Feet (tops, soles, toes, including under the nails, and between toes)  If you have a lot of moles, take digital photos of them each month. Make sure to take photos both up close and from a distance. These can help you see if any moles change over time.   Most skin changes are not cancer. But if you see any changes in your skin, call your doctor right away. Only he or she can diagnose a problem. If you have skin cancer, seeing your doctor can be the first step toward getting the treatment that could save your life.   [employer] last reviewed this educational content on 4/1/2019 2000-2020 [employer]. [address] [place] [address], [place] [address]. All rights reserved. This information is not intended as a substitute for professional medical care. Always follow your healthcare professional's instructions.       When should I call my doctor?  If you are worsening or not improving, please, contact us or seek urgent care as noted below.     Who should I call with questions (adults)?    [facility] [phone]  For urgent needs outside of business hours call the [facility] at  [phone] and ask for the dermatology resident on call to be paged  If this is a medical emergency and you are unable to reach an ER, Call [phone]      If you need a prescription refill, please contact your pharmacy. Refills are approved or denied by our Physicians during normal business hours, Monday through Friday.  Per office policy, refills will not be granted if you have not been seen within the past year (or sooner depending on the condition).

## 2025-07-28 NOTE — LETTER
7/28/2025      Flash Nicholas  [address] [facility] [place] [place] [id number]      Dear Colleague,    Thank you for referring your patient, Flash Nicholas, to the [facility]. Please see a copy of my visit note below.    Flash Nicholas is an extremely pleasant 50 year old year old male patient here today for spot on left temporal scalp.  Patient has no other skin complaints today.  Remainder of the HPI, Meds, PMH, Allergies, FH, and SH was reviewed in chart.      Past Medical History:   Diagnosis Date     HTN (hypertension)      KEN (obstructive sleep apnea)        History reviewed. No pertinent surgical history.     Family History   Problem Relation Age of Onset     Snoring Father        Social History     Socioeconomic History     Marital status:      Spouse name: Not on file     Number of children: Not on file     Years of education: Not on file     Highest education level: Not on file   Occupational History     Not on file   Tobacco Use     Smoking status: Former     Types: Cigarettes     Smokeless tobacco: Never   Substance and Sexual Activity     Alcohol use: Not on file     Drug use: Not on file     Sexual activity: Not on file   Other Topics Concern     Not on file   Social History Narrative     Not on file     Social Drivers of Health     Financial Resource Strain: Low Risk  (8/25/2024)    Financial Resource Strain      Within the past 12 months, have you or your family members you live with been unable to get utilities (heat, electricity) when it was really needed?: No   Food Insecurity: Low Risk  (8/25/2024)    Food Insecurity      Within the past 12 months, did you worry that your food would run out before you got money to buy more?: No      Within the past 12 months, did the food you bought just not last and you didn t have money to get more?: No   Transportation Needs: Low Risk  (8/25/2024)    Transportation Needs      Within the past 12 months, has lack of transportation  kept you from medical appointments, getting your medicines, non-medical meetings or appointments, work, or from getting things that you need?: No   Physical Activity: Sufficiently Active (8/25/2024)    Exercise Vital Sign      Days of Exercise per Week: 3 days      Minutes of Exercise per Session: 60 min   Stress: No Stress Concern Present (8/25/2024)    [employer] - Occupational Stress Questionnaire      Feeling of Stress : Not at all   Social Connections: Unknown (8/25/2024)    Social Connection and Isolation Panel [NHANES]      Frequency of Communication with Friends and Family: Not on file      Frequency of Social Gatherings with Friends and Family: Patient declined      Attends Synagogue Services: Not on file      Active Member of Clubs or Organizations: Not on file      Attends Club or Organization Meetings: Not on file      Marital Status: Not on file   Interpersonal Safety: Low Risk  (3/28/2025)    Interpersonal Safety      Do you feel physically and emotionally safe where you currently live?: Yes      Within the past 12 months, have you been hit, slapped, kicked or otherwise physically hurt by someone?: No      Within the past 12 months, have you been humiliated or emotionally abused in other ways by your partner or ex-partner?: No   Housing Stability: Low Risk  (8/25/2024)    Housing Stability      Do you have housing? : Yes      Are you worried about losing your housing?: No       Outpatient Encounter Medications as of 7/28/2025   Medication Sig Dispense Refill     losartan-hydrochlorothiazide (HYZAAR) 100-25 MG tablet Take 1 tablet by mouth daily. 90 tablet 1     No facility-administered encounter medications on file as of 7/28/2025.             O:   NAD, WDWN, Alert & Oriented, Mood & Affect wnl, Vitals stable   General appearance normal   Vitals stable   Alert, oriented and in no acute distress     L temporal scalp stuck on papule     Stuck on papules and brown macules on  trunk and ext   Red papules on scalp   Flesh colored papules on scalp          Eyes: Conjunctivae/lids:Normal     ENT: Lips, mucosa: normal    MSK:Normal    Cardiovascular: peripheral edema none    Pulm: Breathing Normal    Neuro/Psych: Orientation:Alert and Orientedx3 ; Mood/Affect:normal       A/P:  1. Seborrheic keratosis, lentigo, angioma, dermal nevus  It was a pleasure speaking to Flash Nicholas today.  Previous clinic notes and pertinent laboratory tests were reviewed prior to Flash Nicholas's visit.    Nature and genetics of benign skin lesions dicussed with patient.  Patient encouraged to perform monthly skin exams.  UV precautions reviewed with patient.  Return to clinic prn       Again, thank you for allowing me to participate in the care of your patient.        Sincerely,        Ced Grant MD    Electronically signed

## 2025-07-29 ENCOUNTER — PATIENT OUTREACH (OUTPATIENT)
Dept: CARE COORDINATION | Facility: CLINIC | Age: 50
End: 2025-07-29
Payer: COMMERCIAL

## 2025-08-12 ENCOUNTER — PATIENT OUTREACH (OUTPATIENT)
Dept: CARE COORDINATION | Facility: CLINIC | Age: 50
End: 2025-08-12
Payer: COMMERCIAL